# Patient Record
Sex: FEMALE | Race: WHITE | Employment: OTHER | URBAN - METROPOLITAN AREA
[De-identification: names, ages, dates, MRNs, and addresses within clinical notes are randomized per-mention and may not be internally consistent; named-entity substitution may affect disease eponyms.]

---

## 2017-01-09 RX ORDER — LOSARTAN POTASSIUM 50 MG/1
TABLET ORAL
Qty: 30 TABLET | Refills: 11 | Status: SHIPPED | OUTPATIENT
Start: 2017-01-09 | End: 2017-03-21

## 2017-03-20 ENCOUNTER — TELEPHONE (OUTPATIENT)
Dept: INTERNAL MEDICINE CLINIC | Facility: CLINIC | Age: 82
End: 2017-03-20

## 2017-03-20 NOTE — TELEPHONE ENCOUNTER
[place] pharmacist is asking if medication LOSARTAN POTASSIUM 50 MG Oral Tab can be combined to provide as a 90 day supply  States pt will get a better price, with 90 day supply. Please advise.

## 2017-03-21 RX ORDER — LOSARTAN POTASSIUM 50 MG/1
50 TABLET ORAL
Qty: 90 TABLET | Refills: 0 | Status: SHIPPED | OUTPATIENT
Start: 2017-03-21 | End: 2021-01-06

## 2017-03-21 NOTE — TELEPHONE ENCOUNTER
Hypertensive Medications: 90 day supply issued per protocol    Protocol Criteria:  · Appointment scheduled in the past 6 months or in the next 3 months  · BMP or CMP in the past 12 months  · Creatinine result < 2  Recent Visits       Provider Department Pr

## 2017-04-08 RX ORDER — LEVOTHYROXINE SODIUM 0.05 MG/1
TABLET ORAL
Qty: 90 TABLET | Refills: 2 | Status: SHIPPED | OUTPATIENT
Start: 2017-04-08 | End: 2021-01-06

## 2017-07-21 ENCOUNTER — HOSPITAL ENCOUNTER (EMERGENCY)
Facility: HOSPITAL | Age: 82
Discharge: HOME | End: 2017-07-21
Attending: EMERGENCY MEDICINE
Payer: MEDICARE

## 2017-07-21 ENCOUNTER — APPOINTMENT (OUTPATIENT)
Dept: ULTRASOUND IMAGING | Facility: HOSPITAL | Age: 82
End: 2017-07-21
Attending: EMERGENCY MEDICINE
Payer: MEDICARE

## 2017-07-21 VITALS
OXYGEN SATURATION: 94 % | WEIGHT: 180 LBS | HEIGHT: 69 IN | DIASTOLIC BLOOD PRESSURE: 77 MMHG | SYSTOLIC BLOOD PRESSURE: 150 MMHG | HEART RATE: 67 BPM | TEMPERATURE: 97 F | RESPIRATION RATE: 18 BRPM | BODY MASS INDEX: 26.66 KG/M2

## 2017-07-21 DIAGNOSIS — R60.0 BILATERAL LEG EDEMA: Primary | ICD-10-CM

## 2017-07-21 LAB
ANION GAP SERPL CALC-SCNC: 8 MMOL/L (ref 0–18)
BASOPHILS # BLD: 0.1 K/UL (ref 0–0.2)
BASOPHILS NFR BLD: 1 %
BUN SERPL-MCNC: 16 MG/DL (ref 8–20)
BUN/CREAT SERPL: 14.2 (ref 10–20)
CALCIUM SERPL-MCNC: 8.9 MG/DL (ref 8.5–10.5)
CHLORIDE SERPL-SCNC: 104 MMOL/L (ref 95–110)
CO2 SERPL-SCNC: 27 MMOL/L (ref 22–32)
CREAT SERPL-MCNC: 1.13 MG/DL (ref 0.5–1.5)
EOSINOPHIL # BLD: 0.2 K/UL (ref 0–0.7)
EOSINOPHIL NFR BLD: 2 %
ERYTHROCYTE [DISTWIDTH] IN BLOOD BY AUTOMATED COUNT: 13.2 % (ref 11–15)
GLUCOSE SERPL-MCNC: 102 MG/DL (ref 70–99)
HCT VFR BLD AUTO: 40.1 % (ref 35–48)
HGB BLD-MCNC: 13.5 G/DL (ref 12–16)
LYMPHOCYTES # BLD: 1.6 K/UL (ref 1–4)
LYMPHOCYTES NFR BLD: 23 %
MCH RBC QN AUTO: 32.7 PG (ref 27–32)
MCHC RBC AUTO-ENTMCNC: 33.7 G/DL (ref 32–37)
MCV RBC AUTO: 97.1 FL (ref 80–100)
MONOCYTES # BLD: 0.4 K/UL (ref 0–1)
MONOCYTES NFR BLD: 6 %
NEUTROPHILS # BLD AUTO: 4.8 K/UL (ref 1.8–7.7)
NEUTROPHILS NFR BLD: 68 %
OSMOLALITY UR CALC.SUM OF ELEC: 289 MOSM/KG (ref 275–295)
PLATELET # BLD AUTO: 230 K/UL (ref 140–400)
PMV BLD AUTO: 8.8 FL (ref 7.4–10.3)
POTASSIUM SERPL-SCNC: 3.8 MMOL/L (ref 3.3–5.1)
RBC # BLD AUTO: 4.13 M/UL (ref 3.7–5.4)
SODIUM SERPL-SCNC: 139 MMOL/L (ref 136–144)
WBC # BLD AUTO: 7 K/UL (ref 4–11)

## 2017-07-21 PROCEDURE — 93010 ELECTROCARDIOGRAM REPORT: CPT | Performed by: EMERGENCY MEDICINE

## 2017-07-21 PROCEDURE — 36415 COLL VENOUS BLD VENIPUNCTURE: CPT

## 2017-07-21 PROCEDURE — 80048 BASIC METABOLIC PNL TOTAL CA: CPT | Performed by: EMERGENCY MEDICINE

## 2017-07-21 PROCEDURE — 99285 EMERGENCY DEPT VISIT HI MDM: CPT

## 2017-07-21 PROCEDURE — 93970 EXTREMITY STUDY: CPT | Performed by: EMERGENCY MEDICINE

## 2017-07-21 PROCEDURE — 85025 COMPLETE CBC W/AUTO DIFF WBC: CPT | Performed by: EMERGENCY MEDICINE

## 2017-07-21 PROCEDURE — 93005 ELECTROCARDIOGRAM TRACING: CPT

## 2017-07-22 NOTE — ED NOTES
Anay Luque presents to ER c/o bilateral leg swelling x 1 week or so, pain around left ankle otherwise no pain. Pt has no hx of pvd, diabetes or htn and has never had a blood clot.

## 2017-07-22 NOTE — ED PROVIDER NOTES
Patient Seen in: [facility] AND [facility] Emergency Department    History   Patient presents with:  Swelling    Stated Complaint: left leg swelling    HPI    20-year-old female presents for complaint of bilateral lower extremity swelling for the past several da CHOLECYSTECTOMY      Comment: lap olga; laparoscopic lysis of adhesions  11/7/2011, 11/29/2007, 10/18/2007: OTHER SURGICAL HISTORY      Comment: 2011 flexible cystoscopy  9/24/13: XR DEXA BONE DENSITY AXIAL (CPT=77080)    Medications :   LEVOTHYROXINE SOD [07/21/17 1912]  SpO2: 98 % [07/21/17 1912]  O2 Device: None (Room air) [07/21/17 1912]    Current:/77   Pulse 67   Temp (!) 97.3 °F (36.3 °C) (Oral)   Resp 18   Ht 175.3 cm (5' 9\")   Wt 81.6 kg   SpO2 94%   BMI 26.58 kg/m²         Physical Exam   C ------                     CBC W/ DIFFERENTIAL[[id number]]          Abnormal            Final result                 Please view results for these tests on the individual orders. EKG    Rate, intervals and axes as noted on EKG Report.   Rate: 62  Rhythm: Dictated by (CST): Fadi Akbar MD on 7/21/2017 at 20:02     Approved by (CST): Fadi Akbar MD on 7/21/2017 at 20:03          CONCLUSION:  1. No evidence of left and right lower extremity DVT.  2.  Small unruptured Baker's cyst behind the right knee simil

## 2017-10-19 NOTE — TELEPHONE ENCOUNTER
Requesting Sertraline refill    Contacted pt as last OV 9/26/16, pt states she will call back to schedule px with new provider.   Please advise refill    Refill Protocol Appointment Criteria  · Appointment scheduled in the past 6 months or in the next 3 mon

## 2018-02-21 ENCOUNTER — PATIENT OUTREACH (OUTPATIENT)
Dept: CASE MANAGEMENT | Age: 83
End: 2018-02-21

## 2018-02-21 NOTE — PROGRESS NOTES
Outreached to patient in regards to enrollment to Chronic Care Management program. Patient stated that she has not established care with a new PCP since Dr. Jennifer James retired. I informed patient I will call her back in one month and patient agreed.  Thank you

## 2018-04-09 RX ORDER — LEVOTHYROXINE SODIUM 0.05 MG/1
TABLET ORAL
Qty: 90 TABLET | Refills: 1 | OUTPATIENT
Start: 2018-04-09

## 2018-04-11 ENCOUNTER — LAB ENCOUNTER (OUTPATIENT)
Dept: LAB | Facility: HOSPITAL | Age: 83
End: 2018-04-11
Attending: INTERNAL MEDICINE
Payer: MEDICARE

## 2018-04-11 DIAGNOSIS — E78.2 MIXED HYPERLIPIDEMIA: Primary | ICD-10-CM

## 2018-04-11 DIAGNOSIS — I51.9 MYXEDEMA HEART DISEASE: ICD-10-CM

## 2018-04-11 DIAGNOSIS — E03.9 MYXEDEMA HEART DISEASE: ICD-10-CM

## 2018-04-11 PROCEDURE — 81001 URINALYSIS AUTO W/SCOPE: CPT

## 2018-04-11 PROCEDURE — 84443 ASSAY THYROID STIM HORMONE: CPT

## 2018-04-11 PROCEDURE — 84439 ASSAY OF FREE THYROXINE: CPT

## 2018-04-11 PROCEDURE — 80061 LIPID PANEL: CPT

## 2018-04-11 PROCEDURE — 80053 COMPREHEN METABOLIC PANEL: CPT

## 2018-04-11 PROCEDURE — 85025 COMPLETE CBC W/AUTO DIFF WBC: CPT

## 2018-04-11 PROCEDURE — 36415 COLL VENOUS BLD VENIPUNCTURE: CPT

## 2018-04-12 ENCOUNTER — HOSPITAL ENCOUNTER (OUTPATIENT)
Dept: MAMMOGRAPHY | Facility: HOSPITAL | Age: 83
Discharge: HOME | End: 2018-04-12
Attending: INTERNAL MEDICINE
Payer: MEDICARE

## 2018-04-12 DIAGNOSIS — Z12.31 ENCOUNTER FOR SCREENING MAMMOGRAM FOR MALIGNANT NEOPLASM OF BREAST: ICD-10-CM

## 2018-04-12 PROCEDURE — 77067 SCR MAMMO BI INCL CAD: CPT | Performed by: INTERNAL MEDICINE

## 2018-04-14 ENCOUNTER — LAB ENCOUNTER (OUTPATIENT)
Dept: LAB | Facility: HOSPITAL | Age: 83
End: 2018-04-14
Attending: INTERNAL MEDICINE
Payer: MEDICARE

## 2018-04-14 DIAGNOSIS — I51.9 MYXEDEMA HEART DISEASE: ICD-10-CM

## 2018-04-14 DIAGNOSIS — E78.2 MIXED HYPERLIPIDEMIA: Primary | ICD-10-CM

## 2018-04-14 DIAGNOSIS — E03.9 MYXEDEMA HEART DISEASE: ICD-10-CM

## 2018-04-14 PROCEDURE — 81001 URINALYSIS AUTO W/SCOPE: CPT

## 2018-04-14 PROCEDURE — 88108 CYTOPATH CONCENTRATE TECH: CPT

## 2018-04-18 ENCOUNTER — HOSPITAL ENCOUNTER (OUTPATIENT)
Dept: ULTRASOUND IMAGING | Facility: HOSPITAL | Age: 83
Discharge: HOME | End: 2018-04-18
Attending: INTERNAL MEDICINE
Payer: MEDICARE

## 2018-04-18 DIAGNOSIS — R31.21 ASYMPTOMATIC MICROSCOPIC HEMATURIA: ICD-10-CM

## 2018-04-18 PROCEDURE — 76770 US EXAM ABDO BACK WALL COMP: CPT | Performed by: INTERNAL MEDICINE

## 2018-08-14 ENCOUNTER — OFFICE VISIT (OUTPATIENT)
Dept: SURGERY | Facility: CLINIC | Age: 83
End: 2018-08-14
Payer: COMMERCIAL

## 2018-08-14 DIAGNOSIS — R82.89 ABNORMAL URINE CYTOLOGY: ICD-10-CM

## 2018-08-14 DIAGNOSIS — R31.29 MICROSCOPIC HEMATURIA: Primary | ICD-10-CM

## 2018-08-14 LAB
APPEARANCE: CLEAR
BILIRUBIN: NEGATIVE
GLUCOSE (URINE DIPSTICK): NEGATIVE MG/DL
KETONES (URINE DIPSTICK): NEGATIVE MG/DL
LEUKOCYTES: NEGATIVE
MULTISTIX LOT#: NORMAL NUMERIC
NITRITE, URINE: NEGATIVE
PH, URINE: 5.5 (ref 4.5–8)
PROTEIN (URINE DIPSTICK): NEGATIVE MG/DL
SPECIFIC GRAVITY: 1.01 (ref 1–1.03)
URINE-COLOR: YELLOW
UROBILINOGEN,SEMI-QN: 0.2 MG/DL (ref 0–1.9)

## 2018-08-14 PROCEDURE — 99204 OFFICE O/P NEW MOD 45 MIN: CPT | Performed by: UROLOGY

## 2018-08-14 PROCEDURE — 99212 OFFICE O/P EST SF 10 MIN: CPT | Performed by: UROLOGY

## 2018-08-14 PROCEDURE — 81002 URINALYSIS NONAUTO W/O SCOPE: CPT | Performed by: UROLOGY

## 2018-08-14 RX ORDER — LEVOFLOXACIN 500 MG/1
500 TABLET, FILM COATED ORAL DAILY
Qty: 3 TABLET | Refills: 0 | Status: SHIPPED | OUTPATIENT
Start: 2018-08-14 | End: 2018-08-17

## 2018-08-14 NOTE — PROGRESS NOTES
[facility], [facility]  Initial Office Consultation    HPI:   Luis Armando Serna is a 80year old female here today referred by Dr. Sadiq Marcus in consultation for asymptomatic microscopic hematuria and a voided urine cytology revealing atypical cells.     Pe  , 195 Vaginal, 196 Vaginal    • Intestinal adhesions 3/5/2009    lap lysis of adhesions   • Liver cyst 2011    2 probable small cysts or hematomas R lobe of liver - benign   • Onychomycosis    • Other seborrheic keratosis 2014   • Papanicol by mouth daily. Disp: 30 tablet Rfl: 0       Allergies: Patient has no known allergies. REVIEW OF SYSTEMS:  Review of Systems   Constitutional: Negative for appetite change, chills, fatigue, fever and unexpected weight change.    HENT: Negative for heari 04/11/2018       Lab Results  Component Value Date   GLU 90 04/11/2018   BUN 11 04/11/2018   BUNCREA 10.0 04/11/2018   CREATSERUM 1.10 04/11/2018   ANIONGAP 7 04/11/2018   GFRNAA 47 (L) 04/11/2018   GFRAA 54 (L) 04/11/2018   CA 8.8 04/11/2018    04/1 3 day course of Levaquin to be started the day prior to scheduled cystoscopy. The patient was encouraged to reestablish care with a PCP following the detention of her last PCP.     Anais Washburn MD  8/14/2018 11:31 AM

## 2018-08-16 ENCOUNTER — TELEPHONE (OUTPATIENT)
Dept: SURGERY | Facility: CLINIC | Age: 83
End: 2018-08-16

## 2018-08-16 NOTE — TELEPHONE ENCOUNTER
Clayton received a call from Bev Santos in [facility] [address] [place] [address] who called to inform that this pt's CT urogram that is schd for 8/18 at 9:30 am needs PA thru [place] [facility] [place] at [phone].  I called this # and went thru an automated system and was told

## 2018-08-16 NOTE — TELEPHONE ENCOUNTER
Clayton received a call from Tabatha in [address] who called to inform that pt has a CT urogramscan schd for 8/18 at 9:30 am at [facility] and she needs PA. He forwarded this to me since I am assigned to PA's this week.  I called [employer] at [phone] and ton

## 2018-08-18 ENCOUNTER — HOSPITAL ENCOUNTER (OUTPATIENT)
Dept: CT IMAGING | Facility: HOSPITAL | Age: 83
Discharge: HOME | End: 2018-08-18
Attending: UROLOGY
Payer: MEDICARE

## 2018-08-18 DIAGNOSIS — R82.89 ABNORMAL URINE CYTOLOGY: ICD-10-CM

## 2018-08-18 DIAGNOSIS — R31.29 MICROSCOPIC HEMATURIA: ICD-10-CM

## 2018-08-18 LAB — CREAT BLD-MCNC: 1.2 MG/DL (ref 0.5–1.5)

## 2018-08-18 PROCEDURE — 76376 3D RENDER W/INTRP POSTPROCES: CPT | Performed by: UROLOGY

## 2018-08-18 PROCEDURE — 82565 ASSAY OF CREATININE: CPT

## 2018-08-18 PROCEDURE — 74178 CT ABD&PLV WO CNTR FLWD CNTR: CPT | Performed by: UROLOGY

## 2018-08-20 ENCOUNTER — TELEPHONE (OUTPATIENT)
Dept: SURGERY | Facility: CLINIC | Age: 83
End: 2018-08-20

## 2018-08-20 DIAGNOSIS — Z01.818 PRE-OP TESTING: Primary | ICD-10-CM

## 2018-08-20 NOTE — TELEPHONE ENCOUNTER
Dr. Aki Torres came to me about schdg this pt's surgery since Silvia Graves is off today.  I told him that I will make the labs and fill out the schdg form for her but I ma not familiar how she schd's pt's on the computer and I do not see that the schd is templateted to ad

## 2018-08-21 ENCOUNTER — TELEPHONE (OUTPATIENT)
Dept: SURGERY | Facility: CLINIC | Age: 83
End: 2018-08-21

## 2018-08-21 DIAGNOSIS — N28.89 RIGHT RENAL MASS: Primary | ICD-10-CM

## 2018-08-21 NOTE — TELEPHONE ENCOUNTER
Taisha Hernandez states case needs to be resubmitted as retrospective case for more info call [phone].

## 2018-08-23 ENCOUNTER — LAB ENCOUNTER (OUTPATIENT)
Dept: LAB | Facility: HOSPITAL | Age: 83
End: 2018-08-23
Attending: UROLOGY
Payer: MEDICARE

## 2018-08-23 ENCOUNTER — APPOINTMENT (OUTPATIENT)
Dept: LAB | Facility: HOSPITAL | Age: 83
End: 2018-08-23
Attending: UROLOGY
Payer: MEDICARE

## 2018-08-23 ENCOUNTER — HOSPITAL ENCOUNTER (OUTPATIENT)
Dept: GENERAL RADIOLOGY | Facility: HOSPITAL | Age: 83
Discharge: HOME | End: 2018-08-23
Attending: UROLOGY
Payer: MEDICARE

## 2018-08-23 DIAGNOSIS — Z01.818 PRE-OP TESTING: ICD-10-CM

## 2018-08-23 LAB
ANION GAP SERPL CALC-SCNC: 7 MMOL/L (ref 0–18)
APTT PPP: 29.4 SECONDS (ref 23.2–35.3)
BASOPHILS # BLD: 0 K/UL (ref 0–0.2)
BASOPHILS NFR BLD: 1 %
BUN SERPL-MCNC: 12 MG/DL (ref 8–20)
BUN/CREAT SERPL: 11.7 (ref 10–20)
CALCIUM SERPL-MCNC: 8.8 MG/DL (ref 8.5–10.5)
CHLORIDE SERPL-SCNC: 104 MMOL/L (ref 95–110)
CO2 SERPL-SCNC: 26 MMOL/L (ref 22–32)
CREAT SERPL-MCNC: 1.03 MG/DL (ref 0.5–1.5)
EOSINOPHIL # BLD: 0.1 K/UL (ref 0–0.7)
EOSINOPHIL NFR BLD: 2 %
ERYTHROCYTE [DISTWIDTH] IN BLOOD BY AUTOMATED COUNT: 13 % (ref 11–15)
GLUCOSE SERPL-MCNC: 90 MG/DL (ref 70–99)
HCT VFR BLD AUTO: 43.1 % (ref 35–48)
HGB BLD-MCNC: 14.3 G/DL (ref 12–16)
INR BLD: 1 (ref 0.9–1.2)
LYMPHOCYTES # BLD: 1.5 K/UL (ref 1–4)
LYMPHOCYTES NFR BLD: 26 %
MCH RBC QN AUTO: 33.3 PG (ref 27–32)
MCHC RBC AUTO-ENTMCNC: 33.2 G/DL (ref 32–37)
MCV RBC AUTO: 100.4 FL (ref 80–100)
MONOCYTES # BLD: 0.3 K/UL (ref 0–1)
MONOCYTES NFR BLD: 6 %
NEUTROPHILS # BLD AUTO: 3.6 K/UL (ref 1.8–7.7)
NEUTROPHILS NFR BLD: 65 %
OSMOLALITY UR CALC.SUM OF ELEC: 283 MOSM/KG (ref 275–295)
PLATELET # BLD AUTO: 217 K/UL (ref 140–400)
PMV BLD AUTO: 9.4 FL (ref 7.4–10.3)
POTASSIUM SERPL-SCNC: 4.2 MMOL/L (ref 3.3–5.1)
PROTHROMBIN TIME: 12.8 SECONDS (ref 11.8–14.5)
RBC # BLD AUTO: 4.29 M/UL (ref 3.7–5.4)
SODIUM SERPL-SCNC: 137 MMOL/L (ref 136–144)
WBC # BLD AUTO: 5.6 K/UL (ref 4–11)

## 2018-08-23 PROCEDURE — 85025 COMPLETE CBC W/AUTO DIFF WBC: CPT

## 2018-08-23 PROCEDURE — 85730 THROMBOPLASTIN TIME PARTIAL: CPT

## 2018-08-23 PROCEDURE — 93005 ELECTROCARDIOGRAM TRACING: CPT

## 2018-08-23 PROCEDURE — 85610 PROTHROMBIN TIME: CPT

## 2018-08-23 PROCEDURE — 93010 ELECTROCARDIOGRAM REPORT: CPT | Performed by: UROLOGY

## 2018-08-23 PROCEDURE — 36415 COLL VENOUS BLD VENIPUNCTURE: CPT

## 2018-08-23 PROCEDURE — 71045 X-RAY EXAM CHEST 1 VIEW: CPT | Performed by: UROLOGY

## 2018-08-23 PROCEDURE — 80048 BASIC METABOLIC PNL TOTAL CA: CPT

## 2018-08-23 NOTE — TELEPHONE ENCOUNTER
Spoke with patient'  bill verified , scheduled cystoscopy, bilateral retrograde pyelogram, right ureteroscopy with biopsy, possible fulguration,double j stent placement.  18 @ 10:00am, [facility]/outpatient, patient will have l

## 2018-08-23 NOTE — TELEPHONE ENCOUNTER
Phoned clinical review number as outlined below, and spoke with reviewer, Carter JOSÉ. She states case was closed. Asked her why this was done, since nurse, Liz Hoffman submitted request as a \"retro auth\". She confirms there was some confusion on their end.  She is

## 2018-08-27 ENCOUNTER — ANESTHESIA EVENT (OUTPATIENT)
Dept: SURGERY | Facility: HOSPITAL | Age: 83
End: 2018-08-27
Payer: MEDICARE

## 2018-08-27 ENCOUNTER — ANESTHESIA (OUTPATIENT)
Dept: SURGERY | Facility: HOSPITAL | Age: 83
End: 2018-08-27
Payer: MEDICARE

## 2018-08-27 ENCOUNTER — HOSPITAL ENCOUNTER (OUTPATIENT)
Facility: HOSPITAL | Age: 83
Setting detail: HOSPITAL OUTPATIENT SURGERY
Discharge: HOME | End: 2018-08-27
Attending: UROLOGY | Admitting: UROLOGY
Payer: MEDICARE

## 2018-08-27 ENCOUNTER — SURGERY (OUTPATIENT)
Age: 83
End: 2018-08-27

## 2018-08-27 ENCOUNTER — APPOINTMENT (OUTPATIENT)
Dept: GENERAL RADIOLOGY | Facility: HOSPITAL | Age: 83
End: 2018-08-27
Attending: UROLOGY
Payer: MEDICARE

## 2018-08-27 VITALS
TEMPERATURE: 98 F | OXYGEN SATURATION: 99 % | HEART RATE: 52 BPM | WEIGHT: 183 LBS | HEIGHT: 69 IN | SYSTOLIC BLOOD PRESSURE: 136 MMHG | BODY MASS INDEX: 27.11 KG/M2 | DIASTOLIC BLOOD PRESSURE: 81 MMHG | RESPIRATION RATE: 18 BRPM

## 2018-08-27 DIAGNOSIS — N28.89 RIGHT RENAL MASS: ICD-10-CM

## 2018-08-27 PROCEDURE — 0T538ZZ DESTRUCTION OF RIGHT KIDNEY PELVIS, VIA NATURAL OR ARTIFICIAL OPENING ENDOSCOPIC: ICD-10-PCS | Performed by: UROLOGY

## 2018-08-27 PROCEDURE — 0T768DZ DILATION OF RIGHT URETER WITH INTRALUMINAL DEVICE, VIA NATURAL OR ARTIFICIAL OPENING ENDOSCOPIC: ICD-10-PCS | Performed by: UROLOGY

## 2018-08-27 PROCEDURE — 74420 UROGRAPHY RTRGR +-KUB: CPT | Performed by: UROLOGY

## 2018-08-27 PROCEDURE — 52332 CYSTOSCOPY AND TREATMENT: CPT | Performed by: UROLOGY

## 2018-08-27 PROCEDURE — 0TB38ZX EXCISION OF RIGHT KIDNEY PELVIS, VIA NATURAL OR ARTIFICIAL OPENING ENDOSCOPIC, DIAGNOSTIC: ICD-10-PCS | Performed by: UROLOGY

## 2018-08-27 PROCEDURE — BT1D1ZZ FLUOROSCOPY OF RIGHT KIDNEY, URETER AND BLADDER USING LOW OSMOLAR CONTRAST: ICD-10-PCS | Performed by: UROLOGY

## 2018-08-27 PROCEDURE — 52354 CYSTOURETERO W/BIOPSY: CPT | Performed by: UROLOGY

## 2018-08-27 DEVICE — STENT URET 6F 26CM WO GW INL: Type: IMPLANTABLE DEVICE | Site: URETER | Status: FUNCTIONAL

## 2018-08-27 RX ORDER — FAMOTIDINE 20 MG/1
20 TABLET ORAL ONCE
Status: DISCONTINUED | OUTPATIENT
Start: 2018-08-27 | End: 2018-08-27 | Stop reason: HOSPADM

## 2018-08-27 RX ORDER — LIDOCAINE HYDROCHLORIDE 10 MG/ML
INJECTION, SOLUTION EPIDURAL; INFILTRATION; INTRACAUDAL; PERINEURAL AS NEEDED
Status: DISCONTINUED | OUTPATIENT
Start: 2018-08-27 | End: 2018-08-27 | Stop reason: SURG

## 2018-08-27 RX ORDER — NALOXONE HYDROCHLORIDE 0.4 MG/ML
80 INJECTION, SOLUTION INTRAMUSCULAR; INTRAVENOUS; SUBCUTANEOUS AS NEEDED
Status: DISCONTINUED | OUTPATIENT
Start: 2018-08-27 | End: 2018-08-27

## 2018-08-27 RX ORDER — GLYCOPYRROLATE 0.2 MG/ML
INJECTION, SOLUTION INTRAMUSCULAR; INTRAVENOUS AS NEEDED
Status: DISCONTINUED | OUTPATIENT
Start: 2018-08-27 | End: 2018-08-27 | Stop reason: SURG

## 2018-08-27 RX ORDER — HALOPERIDOL 5 MG/ML
0.25 INJECTION INTRAMUSCULAR ONCE AS NEEDED
Status: DISCONTINUED | OUTPATIENT
Start: 2018-08-27 | End: 2018-08-27

## 2018-08-27 RX ORDER — MORPHINE SULFATE 4 MG/ML
4 INJECTION, SOLUTION INTRAMUSCULAR; INTRAVENOUS EVERY 10 MIN PRN
Status: DISCONTINUED | OUTPATIENT
Start: 2018-08-27 | End: 2018-08-27

## 2018-08-27 RX ORDER — ONDANSETRON 2 MG/ML
INJECTION INTRAMUSCULAR; INTRAVENOUS AS NEEDED
Status: DISCONTINUED | OUTPATIENT
Start: 2018-08-27 | End: 2018-08-27 | Stop reason: SURG

## 2018-08-27 RX ORDER — ROCURONIUM BROMIDE 10 MG/ML
INJECTION, SOLUTION INTRAVENOUS AS NEEDED
Status: DISCONTINUED | OUTPATIENT
Start: 2018-08-27 | End: 2018-08-27 | Stop reason: SURG

## 2018-08-27 RX ORDER — HYDROCODONE BITARTRATE AND ACETAMINOPHEN 5; 325 MG/1; MG/1
1 TABLET ORAL AS NEEDED
Status: DISCONTINUED | OUTPATIENT
Start: 2018-08-27 | End: 2018-08-27

## 2018-08-27 RX ORDER — MORPHINE SULFATE 4 MG/ML
2 INJECTION, SOLUTION INTRAMUSCULAR; INTRAVENOUS EVERY 10 MIN PRN
Status: DISCONTINUED | OUTPATIENT
Start: 2018-08-27 | End: 2018-08-27

## 2018-08-27 RX ORDER — ONDANSETRON 2 MG/ML
4 INJECTION INTRAMUSCULAR; INTRAVENOUS ONCE AS NEEDED
Status: COMPLETED | OUTPATIENT
Start: 2018-08-27 | End: 2018-08-27

## 2018-08-27 RX ORDER — SODIUM CHLORIDE, SODIUM LACTATE, POTASSIUM CHLORIDE, CALCIUM CHLORIDE 600; 310; 30; 20 MG/100ML; MG/100ML; MG/100ML; MG/100ML
INJECTION, SOLUTION INTRAVENOUS CONTINUOUS
Status: DISCONTINUED | OUTPATIENT
Start: 2018-08-27 | End: 2018-08-27

## 2018-08-27 RX ORDER — HYDROCODONE BITARTRATE AND ACETAMINOPHEN 5; 325 MG/1; MG/1
2 TABLET ORAL AS NEEDED
Status: DISCONTINUED | OUTPATIENT
Start: 2018-08-27 | End: 2018-08-27

## 2018-08-27 RX ORDER — ACETAMINOPHEN 500 MG
1000 TABLET ORAL ONCE
Status: COMPLETED | OUTPATIENT
Start: 2018-08-27 | End: 2018-08-27

## 2018-08-27 RX ORDER — METOCLOPRAMIDE 10 MG/1
10 TABLET ORAL ONCE
Status: DISCONTINUED | OUTPATIENT
Start: 2018-08-27 | End: 2018-08-27 | Stop reason: HOSPADM

## 2018-08-27 RX ORDER — DEXAMETHASONE SODIUM PHOSPHATE 4 MG/ML
VIAL (ML) INJECTION AS NEEDED
Status: DISCONTINUED | OUTPATIENT
Start: 2018-08-27 | End: 2018-08-27 | Stop reason: SURG

## 2018-08-27 RX ORDER — PHENAZOPYRIDINE HYDROCHLORIDE 100 MG/1
100 TABLET, FILM COATED ORAL 3 TIMES DAILY PRN
Qty: 9 TABLET | Refills: 0 | Status: SHIPPED | OUTPATIENT
Start: 2018-08-27 | End: 2018-08-30

## 2018-08-27 RX ORDER — MORPHINE SULFATE 10 MG/ML
6 INJECTION, SOLUTION INTRAMUSCULAR; INTRAVENOUS EVERY 10 MIN PRN
Status: DISCONTINUED | OUTPATIENT
Start: 2018-08-27 | End: 2018-08-27

## 2018-08-27 RX ORDER — NEOSTIGMINE METHYLSULFATE 0.5 MG/ML
INJECTION INTRAVENOUS AS NEEDED
Status: DISCONTINUED | OUTPATIENT
Start: 2018-08-27 | End: 2018-08-27 | Stop reason: SURG

## 2018-08-27 RX ADMIN — ROCURONIUM BROMIDE 35 MG: 10 INJECTION, SOLUTION INTRAVENOUS at 10:35:00

## 2018-08-27 RX ADMIN — LIDOCAINE HYDROCHLORIDE 40 MG: 10 INJECTION, SOLUTION EPIDURAL; INFILTRATION; INTRACAUDAL; PERINEURAL at 10:35:00

## 2018-08-27 RX ADMIN — SODIUM CHLORIDE, SODIUM LACTATE, POTASSIUM CHLORIDE, CALCIUM CHLORIDE: 600; 310; 30; 20 INJECTION, SOLUTION INTRAVENOUS at 10:29:00

## 2018-08-27 RX ADMIN — SODIUM CHLORIDE, SODIUM LACTATE, POTASSIUM CHLORIDE, CALCIUM CHLORIDE: 600; 310; 30; 20 INJECTION, SOLUTION INTRAVENOUS at 12:05:00

## 2018-08-27 RX ADMIN — GLYCOPYRROLATE 0.4 MG: 0.2 INJECTION, SOLUTION INTRAMUSCULAR; INTRAVENOUS at 11:55:00

## 2018-08-27 RX ADMIN — DEXAMETHASONE SODIUM PHOSPHATE 4 MG: 4 MG/ML VIAL (ML) INJECTION at 10:38:00

## 2018-08-27 RX ADMIN — NEOSTIGMINE METHYLSULFATE 3 MG: 0.5 INJECTION INTRAVENOUS at 11:55:00

## 2018-08-27 RX ADMIN — ONDANSETRON 4 MG: 2 INJECTION INTRAMUSCULAR; INTRAVENOUS at 10:38:00

## 2018-08-27 NOTE — ANESTHESIA POSTPROCEDURE EVALUATION
Patient: Grecia Barrow    Procedure Summary     Date:  08/27/18 Room / Location:  [address] [place] [address] MAIN OR 14 / [address] [place] [address] MAIN OR    Anesthesia Start:  5261 Anesthesia Stop:  1218    Procedure:  CYSTOSCOPY (N/A ) Diagnosis:       Right renal mass      (Right renal mass [S36

## 2018-08-27 NOTE — H&P
History & Physical Examination    Patient Name: Rell Arechiga  MRN: [id number]  CSN: [id number]  YOB: 1935    Diagnosis: Asymptomatic microhematuria and atypical voided cytology.  Right renal pelvis lesion    Present Illness: 80year-old fem of liver - benign   • Onychomycosis    • Osteoporosis    • Other seborrheic keratosis 7/24/2014   • Papanicolaou smear 7/17/2012    negative, atrophic pattern; predominantly parabasal cells   • Perineural cysts 2012    • Thrombophlebitis 2012    surgical

## 2018-08-27 NOTE — ANESTHESIA PROCEDURE NOTES
Airway  Date/Time: 8/27/2018 10:38 AM  Urgency: elective    Airway not difficult    General Information and Staff    Patient location during procedure: OR  Anesthesiologist: Deejay Lugo  Performed: anesthesiologist     Indications and Patient Condi

## 2018-08-27 NOTE — OPERATIVE REPORT
[facility]    Operative Note     Chirag Benitoill Location: OR   [facility] [id number] MRN [id number]   Admission Date 8/27/2018 Operation Date 8/27/2018   Attending Physician Singh Meneses MD Operating Physician MD Jamee Lim Appropriate monitoring devices were connected to the patient. Successful induction of general level endotracheal anesthesia was achieved. IV cefoxitin was administered for perioperative surgical prophylaxis.   She was then positioned in dorsal lithotomy w guidance. This was advanced to the level of the proximal ureter. The inner sheath and Amplatz superstiff wire were removed. A Storz digital flexible ureteroscope was then introduced through the access sheath and up to the level of the renal pelvis.   Fle days.       Yefri Rivas MD  8/27/2018  12:46 PM

## 2018-08-27 NOTE — ANESTHESIA PREPROCEDURE EVALUATION
Anesthesia PreOp Note    HPI:     Mariza Engel is a 80year old female who presents for preoperative consultation requested by: Meme Herron MD    Date of Surgery: 8/27/2018    Procedure(s):  CYSTOSCOPY  CYSTOSCOPY RETROGRADE  CYSTOSCOPY Lamar Santos cyst 2011    2 probable small cysts or hematomas R lobe of liver - benign   • Onychomycosis    • Osteoporosis    • Other seborrheic keratosis 7/24/2014   • Papanicolaou smear 7/17/2012    negative, atrophic pattern; predominantly parabasal cells   • Perine 1038   lactated ringers infusion  Intravenous Continuous Marcelle Okeefe MD    HYDROcodone-acetaminophen [facility]) 5-325 MG per tab 1 tablet 1 tablet Oral PRN Marcelle Okeefe MD    Or        HYDROcodone-acetaminophen (NORCO) 5-325 MG per tab 2 tabl Smoker  1.00 Packs/day  For 10.00 Years     Types: Cigarettes    Quit date: 1/1/1996    Smokeless tobacco: Never Used    Alcohol use Yes  0.0 oz/week     Comment: 1 glass of wine/daily    Drug use: No    Sexual activity: No     Other Topics Concern    Caff hypothyroidism,   Abdominal  - normal exam             Anesthesia Plan:   ASA:  2  Plan:   General  Airway:  ETT  Post-op Pain Management: IV analgesics and Oral pain medication  Informed Consent Plan and Risks Discussed With:  Patient and child/children

## 2018-08-29 ENCOUNTER — TELEPHONE (OUTPATIENT)
Dept: SURGERY | Facility: CLINIC | Age: 83
End: 2018-08-29

## 2018-08-30 ENCOUNTER — TELEPHONE (OUTPATIENT)
Dept: SURGERY | Facility: CLINIC | Age: 83
End: 2018-08-30

## 2018-08-30 NOTE — TELEPHONE ENCOUNTER
Pt's daughter called. Surgery 8-27-18. Received a message to call to schedule appt. 9-4-18. Caller stated pt was told to remove cath. 8-30-18. Pt's boyfriend removed today, pt threw up and is in pain.   Please call pt to advise

## 2018-08-30 NOTE — TELEPHONE ENCOUNTER
Patient's boyfriend removed her JJ stent (as instructed by me). Some nausea and mild vomiting might be expected after stent removal, however usually gets better within a day. She also seems constipated. I recommend hydration with oral fluids as able.  Se

## 2018-08-30 NOTE — TELEPHONE ENCOUNTER
See below. Pt is s/p procedure, 8/27/18 Returned daughter's (listed in [employer]) however male answered, stating his name is Anca Craft and he is pt's boyfriend. He states Carlo Whiteside is not there. Informed Bill he is not listed on [employer], therefor I need to speak with pt.  Spo

## 2018-08-30 NOTE — TELEPHONE ENCOUNTER
Phoned Quiana James back and spoke with him. Read to him 's reply, as outlined below in this encounter, in it's entirety. Jameson verbalized understanding, agrees to plan, and is thankful.

## 2018-08-31 ENCOUNTER — TELEPHONE (OUTPATIENT)
Dept: SURGERY | Facility: CLINIC | Age: 83
End: 2018-08-31

## 2018-08-31 NOTE — TELEPHONE ENCOUNTER
See TE from yesterday. Adam Valenzuela requesting to speak to RN, states he is concerned for pt, states she is still vomiting. Pls call thank you.

## 2018-08-31 NOTE — TELEPHONE ENCOUNTER
Spoke to patient and patient's boyfriend, Geovanna Veramatty.   Bill calling to report that he gave patient a stool softener this morning with her oatmeal, but patient \"threw up the pill\", nonetheless reports that patient did have a bowel movement today; reports 3 \"de

## 2018-09-04 ENCOUNTER — TELEPHONE (OUTPATIENT)
Dept: SURGERY | Facility: CLINIC | Age: 83
End: 2018-09-04

## 2018-09-04 ENCOUNTER — OFFICE VISIT (OUTPATIENT)
Dept: SURGERY | Facility: CLINIC | Age: 83
End: 2018-09-04
Payer: COMMERCIAL

## 2018-09-04 VITALS
BODY MASS INDEX: 27 KG/M2 | SYSTOLIC BLOOD PRESSURE: 133 MMHG | DIASTOLIC BLOOD PRESSURE: 80 MMHG | WEIGHT: 183 LBS | TEMPERATURE: 98 F | HEART RATE: 73 BPM

## 2018-09-04 DIAGNOSIS — C65.1 TRANSITIONAL CELL CARCINOMA OF RENAL PELVIS, RIGHT (HCC): Primary | ICD-10-CM

## 2018-09-04 DIAGNOSIS — C65.1 RENAL PELVIS TRANSITIONAL CELL MALIGNANT NEOPLASM, RIGHT (HCC): Primary | ICD-10-CM

## 2018-09-04 PROCEDURE — 99212 OFFICE O/P EST SF 10 MIN: CPT | Performed by: UROLOGY

## 2018-09-04 NOTE — TELEPHONE ENCOUNTER
Patient seen in office, scheduled for laparoscopic right nephroureterectomy with bladder cuff excision  Tuesday 09/25/18 @10:15, with possible move up to 7:30, went over pre-op with patient and , all questions answered verbalized understanding

## 2018-09-04 NOTE — TELEPHONE ENCOUNTER
Dear Elizabeth Siegel, per Marianna Almaraz' request, this is to inform you that your patient is scheduled for laparoscopic right nephroureterectomy with bladder cuff excision, Tuesday 09/25/18, [facility]/ outpatient., Marianna Almaraz is requesting that esteban

## 2018-09-04 NOTE — PROGRESS NOTES
[facility], [facility]  Follow Up Visit    HPI: Aamir Hoff is a 80year old female presents for a follow up visit for pathology discussion following recent procedure.     Patient initially presented on 8/14/2018 with asymptomatic microscopic hematuria hematuria.        EXAM:  /80 (BP Location: Right arm, Patient Position: Sitting, Cuff Size: adult)   Pulse 73   Temp 98.3 °F (36.8 °C) (Oral)   Wt 183 lb (83 kg)   LMP  (LMP Unknown)   BMI 27.02 kg/m²   Physical Exam    Constitutional: She is oriented urothelial cancer, and retrospective series rather than on quality evidence. .    We also went over the general staging and grading of UTUC as per the AJCC.   I explained to them that high-grade lesions are usually higher stage and that they progress more of prospective studies in this regard and that the Vitor at the data is extrapolated from studies on urothelial bladder cancer. The patient and their family asked appropriate questions all of which were addressed to their satisfaction.   She wishes to proce

## 2018-09-12 ENCOUNTER — MYAURORA ACCOUNT LINK (OUTPATIENT)
Dept: OTHER | Age: 83
End: 2018-09-12

## 2018-09-12 ENCOUNTER — PRIOR ORIGINAL RECORDS (OUTPATIENT)
Dept: OTHER | Age: 83
End: 2018-09-12

## 2018-09-13 ENCOUNTER — TELEPHONE (OUTPATIENT)
Dept: SURGERY | Facility: CLINIC | Age: 83
End: 2018-09-13

## 2018-09-13 NOTE — TELEPHONE ENCOUNTER
Received clearance letter from dr. Josephine Arroyo office, patient all clear to proceed with scheduled. , will place in scan bin.

## 2018-09-15 ENCOUNTER — LAB ENCOUNTER (OUTPATIENT)
Dept: LAB | Facility: HOSPITAL | Age: 83
End: 2018-09-15
Attending: UROLOGY
Payer: MEDICARE

## 2018-09-15 DIAGNOSIS — Z01.818 PRE-OP TESTING: ICD-10-CM

## 2018-09-15 LAB
ANTIBODY SCREEN: NEGATIVE
RH BLOOD TYPE: POSITIVE

## 2018-09-15 PROCEDURE — 36415 COLL VENOUS BLD VENIPUNCTURE: CPT

## 2018-09-15 PROCEDURE — 86900 BLOOD TYPING SEROLOGIC ABO: CPT

## 2018-09-15 PROCEDURE — 86850 RBC ANTIBODY SCREEN: CPT

## 2018-09-15 PROCEDURE — 86901 BLOOD TYPING SEROLOGIC RH(D): CPT

## 2018-09-25 ENCOUNTER — ANESTHESIA EVENT (OUTPATIENT)
Dept: SURGERY | Facility: HOSPITAL | Age: 83
DRG: 655 | End: 2018-09-25
Payer: MEDICARE

## 2018-09-25 ENCOUNTER — ANESTHESIA (OUTPATIENT)
Dept: SURGERY | Facility: HOSPITAL | Age: 83
DRG: 655 | End: 2018-09-25
Payer: MEDICARE

## 2018-09-25 ENCOUNTER — HOSPITAL ENCOUNTER (INPATIENT)
Facility: HOSPITAL | Age: 83
LOS: 2 days | Discharge: HOME | DRG: 655 | End: 2018-09-27
Attending: UROLOGY | Admitting: UROLOGY
Payer: MEDICARE

## 2018-09-25 DIAGNOSIS — Z90.5 HISTORY OF NEPHROURETERECTOMY: ICD-10-CM

## 2018-09-25 DIAGNOSIS — C65.1 TRANSITIONAL CELL CARCINOMA OF RENAL PELVIS, RIGHT (HCC): ICD-10-CM

## 2018-09-25 DIAGNOSIS — Z90.6 HISTORY OF NEPHROURETERECTOMY: ICD-10-CM

## 2018-09-25 DIAGNOSIS — C65.1 RENAL PELVIS TRANSITIONAL CELL MALIGNANT NEOPLASM, RIGHT (HCC): Primary | ICD-10-CM

## 2018-09-25 DIAGNOSIS — Z01.818 PRE-OP TESTING: ICD-10-CM

## 2018-09-25 PROCEDURE — 50548 LAPARO REMOVE W/URETER: CPT | Performed by: UROLOGY

## 2018-09-25 PROCEDURE — 0TBB4ZZ EXCISION OF BLADDER, PERCUTANEOUS ENDOSCOPIC APPROACH: ICD-10-PCS | Performed by: UROLOGY

## 2018-09-25 PROCEDURE — 0TB64ZZ EXCISION OF RIGHT URETER, PERCUTANEOUS ENDOSCOPIC APPROACH: ICD-10-PCS | Performed by: UROLOGY

## 2018-09-25 PROCEDURE — 0TT04ZZ RESECTION OF RIGHT KIDNEY, PERCUTANEOUS ENDOSCOPIC APPROACH: ICD-10-PCS | Performed by: UROLOGY

## 2018-09-25 RX ORDER — MIDAZOLAM HYDROCHLORIDE 1 MG/ML
INJECTION INTRAMUSCULAR; INTRAVENOUS AS NEEDED
Status: DISCONTINUED | OUTPATIENT
Start: 2018-09-25 | End: 2018-09-25 | Stop reason: SURG

## 2018-09-25 RX ORDER — ONDANSETRON 2 MG/ML
INJECTION INTRAMUSCULAR; INTRAVENOUS AS NEEDED
Status: DISCONTINUED | OUTPATIENT
Start: 2018-09-25 | End: 2018-09-25 | Stop reason: SURG

## 2018-09-25 RX ORDER — ONDANSETRON 2 MG/ML
4 INJECTION INTRAMUSCULAR; INTRAVENOUS ONCE AS NEEDED
Status: DISCONTINUED | OUTPATIENT
Start: 2018-09-25 | End: 2018-09-25 | Stop reason: HOSPADM

## 2018-09-25 RX ORDER — MORPHINE SULFATE 10 MG/ML
6 INJECTION, SOLUTION INTRAMUSCULAR; INTRAVENOUS EVERY 10 MIN PRN
Status: DISCONTINUED | OUTPATIENT
Start: 2018-09-25 | End: 2018-09-25 | Stop reason: HOSPADM

## 2018-09-25 RX ORDER — SODIUM CHLORIDE, SODIUM LACTATE, POTASSIUM CHLORIDE, CALCIUM CHLORIDE 600; 310; 30; 20 MG/100ML; MG/100ML; MG/100ML; MG/100ML
INJECTION, SOLUTION INTRAVENOUS CONTINUOUS
Status: DISCONTINUED | OUTPATIENT
Start: 2018-09-25 | End: 2018-09-25

## 2018-09-25 RX ORDER — MORPHINE SULFATE 4 MG/ML
4 INJECTION, SOLUTION INTRAMUSCULAR; INTRAVENOUS EVERY 10 MIN PRN
Status: DISCONTINUED | OUTPATIENT
Start: 2018-09-25 | End: 2018-09-25 | Stop reason: HOSPADM

## 2018-09-25 RX ORDER — GLYCOPYRROLATE 0.2 MG/ML
INJECTION INTRAMUSCULAR; INTRAVENOUS AS NEEDED
Status: DISCONTINUED | OUTPATIENT
Start: 2018-09-25 | End: 2018-09-25 | Stop reason: SURG

## 2018-09-25 RX ORDER — METRONIDAZOLE 500 MG/100ML
500 INJECTION, SOLUTION INTRAVENOUS ONCE
Status: COMPLETED | OUTPATIENT
Start: 2018-09-25 | End: 2018-09-25

## 2018-09-25 RX ORDER — LOSARTAN POTASSIUM 50 MG/1
50 TABLET ORAL
Status: DISCONTINUED | OUTPATIENT
Start: 2018-09-25 | End: 2018-09-27

## 2018-09-25 RX ORDER — SENNOSIDES 8.6 MG
17.2 TABLET ORAL 2 TIMES DAILY
Status: DISCONTINUED | OUTPATIENT
Start: 2018-09-25 | End: 2018-09-27

## 2018-09-25 RX ORDER — NALOXONE HYDROCHLORIDE 0.4 MG/ML
80 INJECTION, SOLUTION INTRAMUSCULAR; INTRAVENOUS; SUBCUTANEOUS AS NEEDED
Status: DISCONTINUED | OUTPATIENT
Start: 2018-09-25 | End: 2018-09-25 | Stop reason: HOSPADM

## 2018-09-25 RX ORDER — LEVOTHYROXINE SODIUM 0.05 MG/1
50 TABLET ORAL
Status: DISCONTINUED | OUTPATIENT
Start: 2018-09-25 | End: 2018-09-27

## 2018-09-25 RX ORDER — HYDROCODONE BITARTRATE AND ACETAMINOPHEN 5; 325 MG/1; MG/1
1 TABLET ORAL AS NEEDED
Status: DISCONTINUED | OUTPATIENT
Start: 2018-09-25 | End: 2018-09-25 | Stop reason: HOSPADM

## 2018-09-25 RX ORDER — MORPHINE SULFATE 4 MG/ML
2 INJECTION, SOLUTION INTRAMUSCULAR; INTRAVENOUS EVERY 10 MIN PRN
Status: DISCONTINUED | OUTPATIENT
Start: 2018-09-25 | End: 2018-09-25 | Stop reason: HOSPADM

## 2018-09-25 RX ORDER — CEFAZOLIN SODIUM/WATER 2 G/20 ML
2 SYRINGE (ML) INTRAVENOUS EVERY 8 HOURS
Status: COMPLETED | OUTPATIENT
Start: 2018-09-25 | End: 2018-09-26

## 2018-09-25 RX ORDER — SODIUM CHLORIDE, SODIUM LACTATE, POTASSIUM CHLORIDE, CALCIUM CHLORIDE 600; 310; 30; 20 MG/100ML; MG/100ML; MG/100ML; MG/100ML
INJECTION, SOLUTION INTRAVENOUS CONTINUOUS
Status: DISCONTINUED | OUTPATIENT
Start: 2018-09-25 | End: 2018-09-26

## 2018-09-25 RX ORDER — METOCLOPRAMIDE 10 MG/1
10 TABLET ORAL ONCE
Status: DISCONTINUED | OUTPATIENT
Start: 2018-09-25 | End: 2018-09-25 | Stop reason: HOSPADM

## 2018-09-25 RX ORDER — SODIUM CHLORIDE, SODIUM LACTATE, POTASSIUM CHLORIDE, CALCIUM CHLORIDE 600; 310; 30; 20 MG/100ML; MG/100ML; MG/100ML; MG/100ML
INJECTION, SOLUTION INTRAVENOUS CONTINUOUS
Status: DISCONTINUED | OUTPATIENT
Start: 2018-09-25 | End: 2018-09-25 | Stop reason: HOSPADM

## 2018-09-25 RX ORDER — ROCURONIUM BROMIDE 10 MG/ML
INJECTION, SOLUTION INTRAVENOUS AS NEEDED
Status: DISCONTINUED | OUTPATIENT
Start: 2018-09-25 | End: 2018-09-25 | Stop reason: SURG

## 2018-09-25 RX ORDER — ENOXAPARIN SODIUM 100 MG/ML
40 INJECTION SUBCUTANEOUS DAILY
Status: DISCONTINUED | OUTPATIENT
Start: 2018-09-25 | End: 2018-09-27

## 2018-09-25 RX ORDER — NEOSTIGMINE METHYLSULFATE 0.5 MG/ML
INJECTION INTRAVENOUS AS NEEDED
Status: DISCONTINUED | OUTPATIENT
Start: 2018-09-25 | End: 2018-09-25 | Stop reason: SURG

## 2018-09-25 RX ORDER — LIDOCAINE HYDROCHLORIDE 10 MG/ML
INJECTION, SOLUTION EPIDURAL; INFILTRATION; INTRACAUDAL; PERINEURAL AS NEEDED
Status: DISCONTINUED | OUTPATIENT
Start: 2018-09-25 | End: 2018-09-25 | Stop reason: SURG

## 2018-09-25 RX ORDER — DEXAMETHASONE SODIUM PHOSPHATE 4 MG/ML
VIAL (ML) INJECTION AS NEEDED
Status: DISCONTINUED | OUTPATIENT
Start: 2018-09-25 | End: 2018-09-25 | Stop reason: SURG

## 2018-09-25 RX ORDER — ONDANSETRON 2 MG/ML
4 INJECTION INTRAMUSCULAR; INTRAVENOUS EVERY 6 HOURS PRN
Status: DISCONTINUED | OUTPATIENT
Start: 2018-09-25 | End: 2018-09-27

## 2018-09-25 RX ORDER — ONDANSETRON 4 MG/1
4 TABLET, FILM COATED ORAL EVERY 6 HOURS PRN
Status: DISCONTINUED | OUTPATIENT
Start: 2018-09-25 | End: 2018-09-27

## 2018-09-25 RX ORDER — SODIUM CHLORIDE 0.9 % (FLUSH) 0.9 %
10 SYRINGE (ML) INJECTION AS NEEDED
Status: DISCONTINUED | OUTPATIENT
Start: 2018-09-25 | End: 2018-09-27

## 2018-09-25 RX ORDER — FAMOTIDINE 20 MG/1
20 TABLET ORAL ONCE
Status: DISCONTINUED | OUTPATIENT
Start: 2018-09-25 | End: 2018-09-25 | Stop reason: HOSPADM

## 2018-09-25 RX ORDER — MORPHINE SULFATE 2 MG/ML
2 INJECTION, SOLUTION INTRAMUSCULAR; INTRAVENOUS EVERY 2 HOUR PRN
Status: DISCONTINUED | OUTPATIENT
Start: 2018-09-25 | End: 2018-09-26

## 2018-09-25 RX ORDER — HALOPERIDOL 5 MG/ML
0.25 INJECTION INTRAMUSCULAR ONCE AS NEEDED
Status: DISCONTINUED | OUTPATIENT
Start: 2018-09-25 | End: 2018-09-25 | Stop reason: HOSPADM

## 2018-09-25 RX ORDER — BUPIVACAINE HYDROCHLORIDE 5 MG/ML
INJECTION, SOLUTION EPIDURAL; INTRACAUDAL AS NEEDED
Status: DISCONTINUED | OUTPATIENT
Start: 2018-09-25 | End: 2018-09-25 | Stop reason: HOSPADM

## 2018-09-25 RX ORDER — DOCUSATE SODIUM 100 MG/1
100 CAPSULE, LIQUID FILLED ORAL 2 TIMES DAILY
Status: DISCONTINUED | OUTPATIENT
Start: 2018-09-25 | End: 2018-09-27

## 2018-09-25 RX ORDER — MORPHINE SULFATE 4 MG/ML
4 INJECTION, SOLUTION INTRAMUSCULAR; INTRAVENOUS EVERY 2 HOUR PRN
Status: DISCONTINUED | OUTPATIENT
Start: 2018-09-25 | End: 2018-09-26

## 2018-09-25 RX ORDER — MORPHINE SULFATE 4 MG/ML
6 INJECTION, SOLUTION INTRAMUSCULAR; INTRAVENOUS EVERY 2 HOUR PRN
Status: DISCONTINUED | OUTPATIENT
Start: 2018-09-25 | End: 2018-09-26

## 2018-09-25 RX ORDER — ACETAMINOPHEN 10 MG/ML
1000 INJECTION, SOLUTION INTRAVENOUS EVERY 8 HOURS
Status: DISCONTINUED | OUTPATIENT
Start: 2018-09-25 | End: 2018-09-26

## 2018-09-25 RX ORDER — HYDROCODONE BITARTRATE AND ACETAMINOPHEN 5; 325 MG/1; MG/1
2 TABLET ORAL AS NEEDED
Status: DISCONTINUED | OUTPATIENT
Start: 2018-09-25 | End: 2018-09-25 | Stop reason: HOSPADM

## 2018-09-25 RX ORDER — SODIUM CHLORIDE 9 MG/ML
INJECTION, SOLUTION INTRAVENOUS CONTINUOUS PRN
Status: DISCONTINUED | OUTPATIENT
Start: 2018-09-25 | End: 2018-09-25 | Stop reason: SURG

## 2018-09-25 RX ORDER — ACETAMINOPHEN 500 MG
1000 TABLET ORAL ONCE
Status: COMPLETED | OUTPATIENT
Start: 2018-09-25 | End: 2018-09-25

## 2018-09-25 RX ADMIN — NEOSTIGMINE METHYLSULFATE 4 MG: 0.5 INJECTION INTRAVENOUS at 12:07:00

## 2018-09-25 RX ADMIN — SODIUM CHLORIDE: 9 INJECTION, SOLUTION INTRAVENOUS at 07:48:00

## 2018-09-25 RX ADMIN — SODIUM CHLORIDE: 9 INJECTION, SOLUTION INTRAVENOUS at 12:23:00

## 2018-09-25 RX ADMIN — ROCURONIUM BROMIDE 20 MG: 10 INJECTION, SOLUTION INTRAVENOUS at 09:30:00

## 2018-09-25 RX ADMIN — ONDANSETRON 4 MG: 2 INJECTION INTRAMUSCULAR; INTRAVENOUS at 12:26:00

## 2018-09-25 RX ADMIN — LIDOCAINE HYDROCHLORIDE 40 MG: 10 INJECTION, SOLUTION EPIDURAL; INFILTRATION; INTRACAUDAL; PERINEURAL at 07:39:00

## 2018-09-25 RX ADMIN — SODIUM CHLORIDE: 9 INJECTION, SOLUTION INTRAVENOUS at 09:14:00

## 2018-09-25 RX ADMIN — MIDAZOLAM HYDROCHLORIDE 2 MG: 1 INJECTION INTRAMUSCULAR; INTRAVENOUS at 07:36:00

## 2018-09-25 RX ADMIN — ROCURONIUM BROMIDE 10 MG: 10 INJECTION, SOLUTION INTRAVENOUS at 08:25:00

## 2018-09-25 RX ADMIN — GLYCOPYRROLATE 0.8 MG: 0.2 INJECTION INTRAMUSCULAR; INTRAVENOUS at 12:07:00

## 2018-09-25 RX ADMIN — SODIUM CHLORIDE, SODIUM LACTATE, POTASSIUM CHLORIDE, CALCIUM CHLORIDE: 600; 310; 30; 20 INJECTION, SOLUTION INTRAVENOUS at 07:35:00

## 2018-09-25 RX ADMIN — ROCURONIUM BROMIDE 10 MG: 10 INJECTION, SOLUTION INTRAVENOUS at 10:20:00

## 2018-09-25 RX ADMIN — ROCURONIUM BROMIDE 10 MG: 10 INJECTION, SOLUTION INTRAVENOUS at 11:12:00

## 2018-09-25 RX ADMIN — ROCURONIUM BROMIDE 20 MG: 10 INJECTION, SOLUTION INTRAVENOUS at 08:11:00

## 2018-09-25 RX ADMIN — ROCURONIUM BROMIDE 50 MG: 10 INJECTION, SOLUTION INTRAVENOUS at 07:40:00

## 2018-09-25 RX ADMIN — DEXAMETHASONE SODIUM PHOSPHATE 4 MG: 4 MG/ML VIAL (ML) INJECTION at 08:24:00

## 2018-09-25 RX ADMIN — METRONIDAZOLE 500 MG: 500 INJECTION, SOLUTION INTRAVENOUS at 08:24:00

## 2018-09-25 RX ADMIN — SODIUM CHLORIDE, SODIUM LACTATE, POTASSIUM CHLORIDE, CALCIUM CHLORIDE: 600; 310; 30; 20 INJECTION, SOLUTION INTRAVENOUS at 12:23:00

## 2018-09-25 NOTE — ANESTHESIA PROCEDURE NOTES
ANESTHESIA INTUBATION  Date/Time: 9/25/2018 8:29 AM  Urgency: elective    Airway not difficult    General Information and Staff    Patient location during procedure: OR  Anesthesiologist: Jamshid Craft MD  Resident/CRNA: MARITZA Carty

## 2018-09-25 NOTE — BRIEF OP NOTE
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

## 2018-09-25 NOTE — OPERATIVE REPORT
[facility]    Operative Note     Fredy Pedro Location: OR   CSN [id number] MRN [id number]   Admission Date 9/25/2018 Operation Date 9/25/2018   Service Urology Surgeon Camilla Tuttle MD      Primary Surgeon: Camilla Tuttle MD      Ass induction and prior to starting the procedure. A 20-Fr 2-way ceballos catheter was placed using aseptic technique. The patient was then placed in the flank position. The kidney bar was raised. The table was extended and all pressure points were well-padded. seeding of tumor during manipulation. RENAL HILAR AND KIDNEY DISSECTION  We then marched alongside the anterior surface of the IVC towards the right renal hilum. The duodenum was kocherized and the right renal vein was circumferentially dissected out.  Cherry White applied. The patient was repositioned supine and extubated uneventfully in the Operating Room. She had stable vital signs throughout the surgery and was transferred to the PACU for routine monitoring.     All lap counts, instrument counts and needle coun

## 2018-09-25 NOTE — ANESTHESIA PREPROCEDURE EVALUATION
Anesthesia PreOp Note    HPI:     Fredy Vasquez is a 80year old female who presents for preoperative consultation requested by: Sharon Zhang MD    Date of Surgery: 9/25/2018    Procedure(s):  LAPAROSCOPIC NEPHROURETERECTOMY  Indication: Transitional pregnancy      Comment:   ,  Vaginal,  Vaginal   3/5/2009: Intestinal adhesions      Comment:  lap lysis of adhesions  2011: Liver cyst      Comment:  2 probable small cysts or hematomas R lobe of liver -                benign  No date:  Fadumo liver   • Cancer Other         Family history of kidney cancer   • Cancer Other         Family history of bladder cancer    • Other (Urolithiasis) Other         Negative family history of Urolithiasis     Social History    Socioeconomic History      Thu 08/23/2018     08/23/2018    MPV 9.4 08/23/2018     Lab Results   Component Value Date     08/23/2018    K 4.2 08/23/2018     08/23/2018    CO2 26 08/23/2018    BUN 12 08/23/2018    CREATSERUM 1.03 08/23/2018    GLU 90 08/23/2018    CA 8

## 2018-09-25 NOTE — H&P
History & Physical Examination    Patient Name: Pipe Delarosa  MRN: [id number]  CSN: [id number]  YOB: 1935    Diagnosis: Right upper tract urothelial cell carcinoma    Present Illness: 80year old female with recently diagnosed right renal COLONOSCOPY  8/27/2018: CYSTOSCOPY; N/A      Comment:  Performed by Meme Brewster MD at [address]  10/18/07, 11/29/07, 1/7/2011: CYSTOSCOPY,INSERT URETERAL STENT  9/24/13: XR DEXA BONE DENSITY AXIAL (CPT=77080)  Family History   Problem Relation Age

## 2018-09-25 NOTE — ANESTHESIA POSTPROCEDURE EVALUATION
Patient: Luc Elias    Procedure Summary     Date:  09/25/18 Room / Location:  [address] [place] [address] MAIN OR 03 / [address] [facility] MAIN OR    Anesthesia Start:  3297 Anesthesia Stop:      Procedure:  LAPAROSCOPIC NEPHROURETERECTOMY (Right ) Diagnosis:       Transitional cell carcino

## 2018-09-25 NOTE — PLAN OF CARE
Problem: Patient/Family Goals  Goal: Patient/Family Long Term Goal  Patient's Long Term Goal: to return home with significant other    Interventions:  - See additional Care Plan goals for specific interventions   Outcome: Progressing    Goal: Patient/Famil

## 2018-09-26 RX ORDER — ONDANSETRON 2 MG/ML
4 INJECTION INTRAMUSCULAR; INTRAVENOUS EVERY 6 HOURS PRN
Status: DISCONTINUED | OUTPATIENT
Start: 2018-09-26 | End: 2018-09-26

## 2018-09-26 RX ORDER — DEXTROSE AND SODIUM CHLORIDE 5; .45 G/100ML; G/100ML
INJECTION, SOLUTION INTRAVENOUS CONTINUOUS
Status: DISCONTINUED | OUTPATIENT
Start: 2018-09-26 | End: 2018-09-27

## 2018-09-26 RX ORDER — MORPHINE SULFATE 2 MG/ML
2 INJECTION, SOLUTION INTRAMUSCULAR; INTRAVENOUS EVERY 2 HOUR PRN
Status: DISCONTINUED | OUTPATIENT
Start: 2018-09-26 | End: 2018-09-27

## 2018-09-26 RX ORDER — OXYCODONE HYDROCHLORIDE AND ACETAMINOPHEN 5; 325 MG/1; MG/1
2 TABLET ORAL EVERY 4 HOURS PRN
Status: DISCONTINUED | OUTPATIENT
Start: 2018-09-26 | End: 2018-09-27

## 2018-09-26 RX ORDER — OXYCODONE HYDROCHLORIDE AND ACETAMINOPHEN 5; 325 MG/1; MG/1
1 TABLET ORAL EVERY 4 HOURS PRN
Status: DISCONTINUED | OUTPATIENT
Start: 2018-09-26 | End: 2018-09-27

## 2018-09-26 NOTE — PROGRESS NOTES
[facility]    Urology Consult Follow-Up Note    Marques Espinal Patient Status:  Inpatient    1935 MRN [id number]   Location [facility] 4W/SW/SE Attending Denny Arriaga, *   Hosp Day # 0 PCP Yane Keenan MD

## 2018-09-26 NOTE — H&P
[facility]    PATIENT'S NAME: Johan Betts   ATTENDING PHYSICIAN: Martin Zamora MD   PATIENT ACCOUNT#:   [de-identified]    LOCATION:  [address] [place] [address] RECORD #:   [id number]       YOB: 1935  ADMISSION DATE:       09 lymphadenopathy or thyromegaly. Carotids with no bruits. LUNGS:  Decreased breath sounds. HEART:  Regular. ABDOMEN:  Tender. Scar on the right side. EXTREMITIES:  No edema. Sensation is normal.  Moves all extremities. No DJD.   Pulses are pre

## 2018-09-26 NOTE — CM/SW NOTE
CTL progression of care note:  During discharge rounds received report that pt is \"wobbly\" when up and weak. Contacted Dr. Leatha Mckoy and informed him of above. Orders for  PT/OT eval received. Pt's RN notified.

## 2018-09-26 NOTE — PROGRESS NOTES
[facility]    Progress Note    [facility] Patient Status:  Inpatient    1935 MRN [id number]   Location [facility] 4W/SW/SE Attending Marcy Francis, [facility] Day # 1 PCP Fernanda Ballard MD        Subject CREATSERUM 1.54 (H) 09/26/2018    BUN 13 09/26/2018     09/26/2018    K 4.7 09/26/2018     09/26/2018    CO2 25 09/26/2018     (H) 09/26/2018    CA 8.1 (L) 09/26/2018    ALB 3.9 04/11/2018    ALKPHO 83 04/11/2018    BILT 0.6 04/11/2018

## 2018-09-26 NOTE — PROGRESS NOTES
[facility]    Urology Consult Follow-Up Note    Edith Javedcharlie Patient Status:  Inpatient    1935 MRN [id number]   Location [facility] 4W/SW/SE Attending Senthil Davis, *   Hosp Day # 1 PCP Maddie Street MD catheter today  8. Labs PRN only if clinical condition changes    Expected D/C home tomorrow.      Onesimo Siddiqi MD  9/26/2018 8:44 AM

## 2018-09-27 VITALS
HEIGHT: 69 IN | TEMPERATURE: 97 F | RESPIRATION RATE: 18 BRPM | WEIGHT: 179.5 LBS | OXYGEN SATURATION: 92 % | SYSTOLIC BLOOD PRESSURE: 143 MMHG | BODY MASS INDEX: 26.59 KG/M2 | DIASTOLIC BLOOD PRESSURE: 73 MMHG | HEART RATE: 92 BPM

## 2018-09-27 PROBLEM — C64.1 RENAL CELL CANCER, RIGHT (HCC): Status: ACTIVE | Noted: 2018-09-27

## 2018-09-27 PROBLEM — Z90.5 STATUS POST NEPHRECTOMY: Status: ACTIVE | Noted: 2018-09-27

## 2018-09-27 RX ORDER — PANTOPRAZOLE SODIUM 40 MG/1
40 TABLET, DELAYED RELEASE ORAL
Status: DISCONTINUED | OUTPATIENT
Start: 2018-09-27 | End: 2018-09-27

## 2018-09-27 RX ORDER — BISACODYL 10 MG
10 SUPPOSITORY, RECTAL RECTAL ONCE
Status: COMPLETED | OUTPATIENT
Start: 2018-09-27 | End: 2018-09-27

## 2018-09-27 RX ORDER — SENNA AND DOCUSATE SODIUM 50; 8.6 MG/1; MG/1
2 TABLET, FILM COATED ORAL
Qty: 20 TABLET | Refills: 0 | Status: SHIPPED | OUTPATIENT
Start: 2018-09-27 | End: 2019-08-08

## 2018-09-27 RX ORDER — ENOXAPARIN SODIUM 100 MG/ML
30 INJECTION SUBCUTANEOUS DAILY
Status: DISCONTINUED | OUTPATIENT
Start: 2018-09-27 | End: 2018-09-27

## 2018-09-27 RX ORDER — FAMOTIDINE 20 MG/1
20 TABLET ORAL 2 TIMES DAILY
Qty: 28 TABLET | Refills: 0 | Status: SHIPPED | OUTPATIENT
Start: 2018-09-27 | End: 2018-10-11

## 2018-09-27 RX ORDER — OXYCODONE HYDROCHLORIDE AND ACETAMINOPHEN 5; 325 MG/1; MG/1
1-2 TABLET ORAL EVERY 6 HOURS PRN
Qty: 30 TABLET | Refills: 0 | Status: SHIPPED | OUTPATIENT
Start: 2018-09-27 | End: 2019-08-08

## 2018-09-27 NOTE — PLAN OF CARE
GASTROINTESTINAL - ADULT    • Minimal or absence of nausea and vomiting Adequate for Discharge        GENITOURINARY - ADULT    • Absence of urinary retention Adequate for Discharge        PAIN - ADULT    • Verbalizes/displays adequate comfort level or esteban

## 2018-09-27 NOTE — PROGRESS NOTES
[facility] Pharmacy Note: Route Optimization for Pantoprazole (PROTONIX)    Patient is currently on Pantoprazole (PROTONIX) 40 mg IV every 24 hours.    The patient meets the criteria to convert to the oral equivalent as established by the IV to Oral conversion pro

## 2018-09-27 NOTE — PROGRESS NOTES
[facility] Pharmacy Note:  Renal Dose Adjustment for Enoxaparin (LOVENOX)    Damien Hawkins has been prescribed Enoxaparin (LOVENOX) 40 mg subcutaneously every 24 hours. Estimated Creatinine Clearance: 29.4 mL/min (A) (based on SCr of 1.54 mg/dL (H)).     Her c

## 2018-09-27 NOTE — PROGRESS NOTES
[facility]  Urology Consult Follow-Up Note    Rell Miss Patient Status:  Inpatient    1935 MRN [id number]   Location [facility] 4W/SW/SE Attending Xi Hwang, [facility] Day # 2 PCP SYLVIA today    Plan to d/c home later today if she continues to do well. Office f/u in 2 weeks for path discussion and wound check, they should call to make appointment.      D/W Dr. Nam Russ MD  9/27/2018 9:43 AM

## 2018-09-27 NOTE — PAYOR COMM NOTE
--------------  ADMISSION REVIEW     [employer] MEDIC[employer] ADV PPO  Subscriber #:  [id number]  Authorization Number: [id number]    Admit date: 9/25/18  Admit time: 0000       Admitting Physician: Csasandra Carvajal MD  Attending Physician:  Melvina Monterroso pregnancy in the distant past, laparoscopic lysis of adhesions, cyst or hematoma in the right lobe of the liver, onychomycosis, osteoporosis, thrombophlebitis, thyroid disease, varicosities, colonoscopy, and cystoscopy.       FAMILY HISTORY:  Father had hea (none) Intravenous Julio C Cherry RN    9/26/2018 1747 New Bag (none) Intravenous Luis Angel Duran RN      docusate sodium (COLACE) cap 100 mg     Date Action Dose Route User    9/27/2018 0830 Given 100 mg Oral Luis Angel Duran RN      Enoxaparin Sodium ( Signs: Blood pressure 120/53, pulse 66, temperature 97 °F (36.1 °C), temperature source Oral, resp. rate 16, height 5' 9\" (1.753 m), weight 179 lb 8 oz (81.4 kg), SpO2 95 %, not currently breastfeeding.      UOP: [place] 400 / 1000 / 500 clear yellow urine Negative. Allergic/Immunologic: Negative. Neurological: Negative. Hematological: Negative. Psychiatric/Behavioral: Negative.               Objective:   Blood pressure 120/53, pulse 66, temperature 97 °F (36.1 °C), temperature source Oral, resp. services that will reasonably be expected to span two midnight's based on the clinical documentation in H+P.    Based on patients current state of illness, I anticipate that, after discharge, patient will require TBD.    9/27 UROLOGY PROGRESS NOTE    Hosp D PT jamar today     Plan to d/c home later today if she continues to do well.  Office f/u in 2 weeks for path discussion and wound check, they should call to make appointment.      D/W Dr. Christopher Padilla

## 2018-09-27 NOTE — OCCUPATIONAL THERAPY NOTE
OCCUPATIONAL THERAPY EVALUATION - INPATIENT     Room Number: 448/448-A  Evaluation Date: 9/27/2018  Type of Evaluation: Initial  Presenting Problem: (R nephrectomy wtih bladder cuff excision )    Physician Order: IP Consult to Occupational Therapy  Reason transitional cell malignant neoplasm, right [facility])      Past Medical History  Past Medical History:   Diagnosis Date   • Abnormal mammogram 2012    R breast   • Ankle sprain    • Benign cyst of left breast 2007    benign cysts left periareolar region   • Be Drives: Yes  Patient Regularly Uses: None    Stairs in Home: 3STE  Use of Assistive Device(s): Has cane     Prior Level of [place]: Pt lives with spouse, reports being independent with ADLs and driving prior.      SUBJECTIVE  Pt noted to be forgetful CJ    FUNCTIONAL TRANSFER ASSESSMENT  Supine to Sit : Not tested(Out of bed at this time )  Sit to Stand: (SBA) w/RW  Toilet Transfer: SBA w/RW  Shower Transfer: n/t   Chair Transfer: SBA w/RW     Bedroom Mobility: SBA w/RW     FUNCTIONAL ADL ASSESSMENT  G

## 2018-09-28 ENCOUNTER — TELEPHONE (OUTPATIENT)
Dept: SURGERY | Facility: CLINIC | Age: 83
End: 2018-09-28

## 2018-09-28 NOTE — PAYOR COMM NOTE
--------------  DISCHARGE REVIEW    Payor: [employer] MEDICARE ADV PPO  Subscriber #:  [id number]  Authorization Number: [id number]    Admit date: 9/25/18  Admit time:  1730  Discharge Date: 9/27/2018  5:33 PM     Admitting Physician: MD Fernanda Dudley decreased. EXTREMITIES:  No edema. DISPOSITION:  Go home. DISCHARGE MEDICATIONS:  See the reconciliation list.    DISCHARGE INSTRUCTIONS:  See me in the office in 1 week.     Dictated By Nisreen Ruiz MD  d: 09/27/2018 16:54:09  t: 09/27/20

## 2018-09-28 NOTE — DISCHARGE SUMMARY
[facility]    PATIENT'S NAME: Mirna Truong   ATTENDING PHYSICIAN: Drea Traylor MD   PATIENT ACCOUNT#:   [de-identified]    LOCATION:  [address] [place] [address] RECORD #:   [id number]       YOB: 1935  ADMISSION DATE:       09

## 2018-10-01 NOTE — TELEPHONE ENCOUNTER
Pt's  called stating pt has not received a call back to set up post op appt, 2 weeks after surgery 9-25-18.   Call

## 2018-10-09 ENCOUNTER — LAB ENCOUNTER (OUTPATIENT)
Dept: LAB | Facility: HOSPITAL | Age: 83
End: 2018-10-09
Attending: UROLOGY
Payer: MEDICARE

## 2018-10-09 ENCOUNTER — OFFICE VISIT (OUTPATIENT)
Dept: SURGERY | Facility: CLINIC | Age: 83
End: 2018-10-09
Payer: COMMERCIAL

## 2018-10-09 VITALS
TEMPERATURE: 98 F | WEIGHT: 183 LBS | DIASTOLIC BLOOD PRESSURE: 70 MMHG | HEIGHT: 69 IN | BODY MASS INDEX: 27.11 KG/M2 | HEART RATE: 69 BPM | SYSTOLIC BLOOD PRESSURE: 130 MMHG

## 2018-10-09 DIAGNOSIS — N18.30 STAGE 3 CHRONIC KIDNEY DISEASE (HCC): Primary | ICD-10-CM

## 2018-10-09 DIAGNOSIS — Z90.5 ACQUIRED SOLITARY KIDNEY: ICD-10-CM

## 2018-10-09 DIAGNOSIS — C65.1 RENAL PELVIS TRANSITIONAL CELL MALIGNANT NEOPLASM, RIGHT (HCC): ICD-10-CM

## 2018-10-09 DIAGNOSIS — Z90.6 HISTORY OF NEPHROURETERECTOMY: ICD-10-CM

## 2018-10-09 DIAGNOSIS — Z90.5 HISTORY OF NEPHROURETERECTOMY: ICD-10-CM

## 2018-10-09 PROCEDURE — 80048 BASIC METABOLIC PNL TOTAL CA: CPT

## 2018-10-09 PROCEDURE — 36415 COLL VENOUS BLD VENIPUNCTURE: CPT

## 2018-10-09 PROCEDURE — 86850 RBC ANTIBODY SCREEN: CPT

## 2018-10-09 PROCEDURE — 86901 BLOOD TYPING SEROLOGIC RH(D): CPT

## 2018-10-09 PROCEDURE — 86900 BLOOD TYPING SEROLOGIC ABO: CPT

## 2018-10-09 NOTE — PROGRESS NOTES
[facility], [facility] Urology  Follow-Up Visit    HPI: Ernesto Dejesus is a 80year old female presents for a follow up visit. 1. 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   She was time.   Skin: Skin is warm and dry. Psychiatric: She has a normal mood and affect.      LABS:    Surgical Pathology                                Case: [id number]                                   Authorizing Provider: Bianca Mari MD            Col 09/25/2018. Final pathology pTa high grade you cc with negative margins (pTa HG Nx Mx). Mrs. Osorio is doing well as far as her postoperative recovery.     I discussed with the patient her risk of developing bladder cancer as well as the risk of developing

## 2019-01-10 ENCOUNTER — OFFICE VISIT (OUTPATIENT)
Dept: SURGERY | Facility: CLINIC | Age: 84
End: 2019-01-10
Payer: COMMERCIAL

## 2019-01-10 VITALS
BODY MASS INDEX: 26.66 KG/M2 | DIASTOLIC BLOOD PRESSURE: 70 MMHG | SYSTOLIC BLOOD PRESSURE: 128 MMHG | TEMPERATURE: 97 F | WEIGHT: 180 LBS | HEIGHT: 69 IN | HEART RATE: 66 BPM

## 2019-01-10 DIAGNOSIS — C65.1 RENAL PELVIS TRANSITIONAL CELL MALIGNANT NEOPLASM, RIGHT (HCC): Primary | ICD-10-CM

## 2019-01-10 PROCEDURE — 52000 CYSTOURETHROSCOPY: CPT | Performed by: UROLOGY

## 2019-01-10 RX ORDER — CIPROFLOXACIN 500 MG/1
500 TABLET, FILM COATED ORAL ONCE
Status: COMPLETED | OUTPATIENT
Start: 2019-01-10 | End: 2019-01-10

## 2019-01-10 RX ADMIN — CIPROFLOXACIN 500 MG: 500 TABLET, FILM COATED ORAL at 14:42:00

## 2019-01-10 NOTE — PROCEDURES
Stasia Spurling  : 1935  Referring Physician: Anna Monahan MD     HPI: H/O pTa right upper tract UCC s/p right radical nephroureterectomy. Here for surveillance cystoscopy. Doing well. Back to coriling.      Procedure: Cystoscopy      CYSTO

## 2019-02-28 VITALS
SYSTOLIC BLOOD PRESSURE: 130 MMHG | HEIGHT: 69 IN | WEIGHT: 180 LBS | DIASTOLIC BLOOD PRESSURE: 76 MMHG | HEART RATE: 73 BPM | RESPIRATION RATE: 18 BRPM | BODY MASS INDEX: 26.66 KG/M2

## 2019-03-04 ENCOUNTER — TELEPHONE (OUTPATIENT)
Dept: SURGERY | Facility: CLINIC | Age: 84
End: 2019-03-04

## 2019-03-04 ENCOUNTER — PATIENT OUTREACH (OUTPATIENT)
Dept: SURGERY | Facility: CLINIC | Age: 84
End: 2019-03-04

## 2019-03-04 NOTE — TELEPHONE ENCOUNTER
After noting that pt has CT scheduled for 4/4/19 [facility] contacted and Expiration date altered to reflect appt scheduled    Approved from 4/4/19-5/4/19 [place] message sent informing to keep scheduled appt, and new expiration date of 4/4/19-5/4/19.

## 2019-03-04 NOTE — TELEPHONE ENCOUNTER
MY chart message sent to pt informing PA for CT urogram approved and to please complete prior to exp. Date.

## 2019-04-04 ENCOUNTER — APPOINTMENT (OUTPATIENT)
Dept: LAB | Facility: HOSPITAL | Age: 84
End: 2019-04-04
Attending: UROLOGY
Payer: MEDICARE

## 2019-04-04 ENCOUNTER — HOSPITAL ENCOUNTER (OUTPATIENT)
Dept: CT IMAGING | Facility: HOSPITAL | Age: 84
Discharge: HOME | End: 2019-04-04
Attending: UROLOGY
Payer: MEDICARE

## 2019-04-04 DIAGNOSIS — C65.1 RENAL PELVIS TRANSITIONAL CELL MALIGNANT NEOPLASM, RIGHT (HCC): ICD-10-CM

## 2019-04-04 PROCEDURE — 74178 CT ABD&PLV WO CNTR FLWD CNTR: CPT | Performed by: UROLOGY

## 2019-04-04 PROCEDURE — 80048 BASIC METABOLIC PNL TOTAL CA: CPT

## 2019-04-04 PROCEDURE — 82565 ASSAY OF CREATININE: CPT

## 2019-04-04 PROCEDURE — 36415 COLL VENOUS BLD VENIPUNCTURE: CPT

## 2019-04-11 ENCOUNTER — OFFICE VISIT (OUTPATIENT)
Dept: SURGERY | Facility: CLINIC | Age: 84
End: 2019-04-11
Payer: COMMERCIAL

## 2019-04-11 VITALS
DIASTOLIC BLOOD PRESSURE: 80 MMHG | HEIGHT: 69 IN | RESPIRATION RATE: 16 BRPM | TEMPERATURE: 97 F | BODY MASS INDEX: 26.66 KG/M2 | SYSTOLIC BLOOD PRESSURE: 130 MMHG | WEIGHT: 180 LBS | HEART RATE: 71 BPM

## 2019-04-11 DIAGNOSIS — C65.1 RENAL PELVIS TRANSITIONAL CELL MALIGNANT NEOPLASM, RIGHT (HCC): Primary | ICD-10-CM

## 2019-04-11 DIAGNOSIS — Z90.5 STATUS POST NEPHRECTOMY: ICD-10-CM

## 2019-04-11 PROCEDURE — 99213 OFFICE O/P EST LOW 20 MIN: CPT | Performed by: UROLOGY

## 2019-04-11 PROCEDURE — 99212 OFFICE O/P EST SF 10 MIN: CPT | Performed by: UROLOGY

## 2019-04-11 PROCEDURE — 52000 CYSTOURETHROSCOPY: CPT | Performed by: UROLOGY

## 2019-04-11 RX ORDER — CIPROFLOXACIN 500 MG/1
500 TABLET, FILM COATED ORAL ONCE
Status: DISCONTINUED | OUTPATIENT
Start: 2019-04-11 | End: 2019-10-10

## 2019-04-11 NOTE — PROGRESS NOTES
[facility], [facility] Urology  Follow-Up Visit    HPI: Damien Hawkins is a 80year old female presents for a follow up visit. Patient was last seen on 1/10/2019. She is accompanied by her boyfriend Tushar Green. INTERVAL HISTORY: Dylan Hunt is doing well.  She denies daly Units 4/4/2019 10/9/2018 9/26/2018 8/23/2018   BUN      7 - 18 mg/dL 22 (H) 16 13 12   CREATININE      0.55 - 1.02 mg/dL 1.47 (H) 1.48 1.54 (H) 1.03     IMAGING:    CT Urogram w+wo IV contrast (4/4/2019) which I personally viewed:    1.  Postoperative sinclair than half the time in face-to-face discussion involving counseling, further management and treatment planning.     Blessing Hussien MD  4/11/2019 2:55 PM

## 2019-04-11 NOTE — PROCEDURES
Fredy Vasquez  : 1935  Referring Physician: Anni Toro MD    Patient presents with:   Other:  H/O pTa right upper tract UCC s/p right radical nephroureterectomy cystoscopy with possible bladder biopsies      CYSTOURETHROSCOPY    Anesth

## 2019-05-20 ENCOUNTER — HOSPITAL ENCOUNTER (OUTPATIENT)
Dept: BONE DENSITY | Facility: HOSPITAL | Age: 84
Discharge: HOME | End: 2019-05-20
Attending: INTERNAL MEDICINE
Payer: MEDICARE

## 2019-05-20 ENCOUNTER — HOSPITAL ENCOUNTER (OUTPATIENT)
Dept: MAMMOGRAPHY | Facility: HOSPITAL | Age: 84
Discharge: HOME | End: 2019-05-20
Attending: INTERNAL MEDICINE
Payer: MEDICARE

## 2019-05-20 DIAGNOSIS — M81.0 OSTEOPOROSIS, UNSPECIFIED OSTEOPOROSIS TYPE, UNSPECIFIED PATHOLOGICAL FRACTURE PRESENCE: ICD-10-CM

## 2019-05-20 DIAGNOSIS — Z12.31 ENCOUNTER FOR SCREENING MAMMOGRAM FOR MALIGNANT NEOPLASM OF BREAST: ICD-10-CM

## 2019-05-20 PROCEDURE — 77080 DXA BONE DENSITY AXIAL: CPT | Performed by: INTERNAL MEDICINE

## 2019-05-20 PROCEDURE — 77067 SCR MAMMO BI INCL CAD: CPT | Performed by: INTERNAL MEDICINE

## 2019-05-20 PROCEDURE — 77063 BREAST TOMOSYNTHESIS BI: CPT | Performed by: INTERNAL MEDICINE

## 2019-06-08 ENCOUNTER — OFFICE VISIT (OUTPATIENT)
Dept: FAMILY MEDICINE CLINIC | Facility: CLINIC | Age: 84
End: 2019-06-08
Payer: MEDICARE

## 2019-06-08 VITALS
DIASTOLIC BLOOD PRESSURE: 64 MMHG | TEMPERATURE: 98 F | WEIGHT: 182 LBS | HEART RATE: 82 BPM | OXYGEN SATURATION: 98 % | BODY MASS INDEX: 26.96 KG/M2 | SYSTOLIC BLOOD PRESSURE: 132 MMHG | HEIGHT: 69 IN

## 2019-06-08 DIAGNOSIS — B02.9 HERPES ZOSTER WITHOUT COMPLICATION: Primary | ICD-10-CM

## 2019-06-08 PROCEDURE — 99202 OFFICE O/P NEW SF 15 MIN: CPT | Performed by: NURSE PRACTITIONER

## 2019-06-08 RX ORDER — VALACYCLOVIR HYDROCHLORIDE 1 G/1
1 TABLET, FILM COATED ORAL
Qty: 21 TABLET | Refills: 0 | Status: SHIPPED | OUTPATIENT
Start: 2019-06-08 | End: 2019-06-15

## 2019-06-08 NOTE — PATIENT INSTRUCTIONS
Shingles (Herpes Zoster)     Talk to your healthcare provider about the shingles vaccine. Shingles is also called herpes zoster. It is a painful skin rash caused by the herpes zoster virus. This is the same virus that causes chickenpox.  After a perso For most people, shingles heals on its own in a few weeks.  But treatment is recommended to help relieve pain, speed healing, and reduce the risk of complications. Antiviral medicines are prescribed within the first 72 hours of the appearance of the rash. T You can only get shingles if you have had chickenpox in the past. Those who have never had chickenpox can get the virus from you. Although instead of developing shingles, the person may get chickenpox.  Until your blisters form scabs, avoid contact with oth

## 2019-06-11 PROBLEM — B02.9 HERPES ZOSTER: Status: ACTIVE | Noted: 2019-06-08

## 2019-06-11 NOTE — PROGRESS NOTES
CHIEF COMPLAINT:   Patient presents with:  Derm Problem: rash on left side, started 2 days ago, painful, pain scale 8 out of 10          HPI:    David Mae is a 80year old female who presents for evaluation of a rash.   Per patient rash started in the History of pregnancy 6532,4883,7011     , 195 Vaginal, 196 Vaginal    • Intestinal adhesions 3/5/2009    lap lysis of adhesions   • Liver cyst     2 probable small cysts or hematomas R lobe of liver - benign   • Onychomycosis    • Osteoporosi No ulcerations. HEENT: Denies rhinorrhea, edema of the lips or swelling of throat. CARDIOVASCULAR: Denies chest pains or palpitations. LUNGS: Denies shortness of breath with exertion or rest. No cough or wheezing.   LYMPH: Denies enlargement of the lymph (three) times daily with meals for 7 days. Risk and benefits of medication discussed. The patient indicates understanding of these issues and agrees to the plan. The patient is asked to return in 3 days if sx's persist or worsen.

## 2019-06-24 ENCOUNTER — HOSPITAL ENCOUNTER (OUTPATIENT)
Dept: MAMMOGRAPHY | Facility: HOSPITAL | Age: 84
Discharge: HOME | End: 2019-06-24
Attending: INTERNAL MEDICINE
Payer: MEDICARE

## 2019-06-24 ENCOUNTER — HOSPITAL ENCOUNTER (OUTPATIENT)
Dept: ULTRASOUND IMAGING | Facility: HOSPITAL | Age: 84
Discharge: HOME | End: 2019-06-24
Attending: INTERNAL MEDICINE
Payer: MEDICARE

## 2019-06-24 DIAGNOSIS — R92.8 ABNORMAL MAMMOGRAM: ICD-10-CM

## 2019-06-24 PROCEDURE — 77062 BREAST TOMOSYNTHESIS BI: CPT | Performed by: INTERNAL MEDICINE

## 2019-06-24 PROCEDURE — 77066 DX MAMMO INCL CAD BI: CPT | Performed by: INTERNAL MEDICINE

## 2019-06-24 PROCEDURE — 76642 ULTRASOUND BREAST LIMITED: CPT | Performed by: INTERNAL MEDICINE

## 2019-08-08 ENCOUNTER — OFFICE VISIT (OUTPATIENT)
Dept: OBGYN CLINIC | Facility: CLINIC | Age: 84
End: 2019-08-08
Payer: COMMERCIAL

## 2019-08-08 VITALS
WEIGHT: 182 LBS | DIASTOLIC BLOOD PRESSURE: 78 MMHG | HEART RATE: 80 BPM | SYSTOLIC BLOOD PRESSURE: 130 MMHG | BODY MASS INDEX: 27 KG/M2

## 2019-08-08 DIAGNOSIS — R92.8 ABNORMAL MAMMOGRAM OF BOTH BREASTS: Primary | ICD-10-CM

## 2019-08-08 PROCEDURE — 99215 OFFICE O/P EST HI 40 MIN: CPT | Performed by: OBSTETRICS & GYNECOLOGY

## 2019-08-08 RX ORDER — DONEPEZIL HYDROCHLORIDE 5 MG/1
TABLET, FILM COATED ORAL
COMMUNITY
Start: 2019-07-25 | End: 2019-08-08

## 2019-08-08 NOTE — PROGRESS NOTES
Irene Ceballos is a 80year old female  No LMP recorded (lmp unknown). Patient is postmenopausal. Patient presents with:  Consult: here for abnormal mammogram. No pain / lumps.   .      OBSTETRICS HISTORY:  OB History     T3    L3    SAB0 NEPHROURETERECTOMY Right 2018    Performed by Marian Rodriguez MD at [address] [place] [address] OR   • LAPAROSCOPICALLY ASSISTED NEPHROURETERECTOMY Right 2018    Dr. Prakash Livers   • XR DEXA BONE DENSITY AXIAL (CPT=77080)  13     OB History     T3    Service: Not Asked        Blood Transfusions: Not Asked        Caffeine Concern: Yes          Daily; one cup, coffee, tea         Occupational Exposure: Not Asked        Hobby Hazards: Not Asked        Sleep Concern: Not Asked        Stress Concer [facility], [facility] PF DIGITAL SCREEN W CAD, 9/04/2013, 8:06. [facility], [facility] PF [facility] CAD, 8/15/2012, 10:27. [facility], [facility]. [facility], [address] [facility] (FEI=[id number]), 6/24/2019, 14:20. mammographic areas of concern. In the right breast at 1 o'clock 1 cm from the nipple, there is a 0.6 x 0.4 x 0.5 cm oval hypoechoic mass with circumscribed margins and no internal vascularity.   This is likely incidental given its location and is probab patient's next mammogram has been entered into a reminder system.        Patient received a discharge summary from the technologist after completion of exam.     Breast marker legend used on images    Triangle = Palpable lump  Dixfield = Skin tag or mole  BB

## 2019-10-10 ENCOUNTER — APPOINTMENT (OUTPATIENT)
Dept: LAB | Facility: HOSPITAL | Age: 84
End: 2019-10-10
Attending: UROLOGY
Payer: MEDICARE

## 2019-10-10 ENCOUNTER — OFFICE VISIT (OUTPATIENT)
Dept: SURGERY | Facility: CLINIC | Age: 84
End: 2019-10-10
Payer: COMMERCIAL

## 2019-10-10 VITALS
BODY MASS INDEX: 25.92 KG/M2 | HEART RATE: 79 BPM | DIASTOLIC BLOOD PRESSURE: 70 MMHG | HEIGHT: 69 IN | WEIGHT: 175 LBS | TEMPERATURE: 98 F | SYSTOLIC BLOOD PRESSURE: 140 MMHG

## 2019-10-10 DIAGNOSIS — Z90.5 STATUS POST NEPHRECTOMY: ICD-10-CM

## 2019-10-10 DIAGNOSIS — C65.1 RENAL PELVIS TRANSITIONAL CELL MALIGNANT NEOPLASM, RIGHT (HCC): ICD-10-CM

## 2019-10-10 DIAGNOSIS — C65.1 RENAL PELVIS TRANSITIONAL CELL MALIGNANT NEOPLASM, RIGHT (HCC): Primary | ICD-10-CM

## 2019-10-10 PROCEDURE — 52000 CYSTOURETHROSCOPY: CPT | Performed by: UROLOGY

## 2019-10-10 PROCEDURE — 80053 COMPREHEN METABOLIC PANEL: CPT

## 2019-10-10 PROCEDURE — 36415 COLL VENOUS BLD VENIPUNCTURE: CPT

## 2019-10-10 PROCEDURE — 99213 OFFICE O/P EST LOW 20 MIN: CPT | Performed by: UROLOGY

## 2019-10-10 RX ORDER — CIPROFLOXACIN 500 MG/1
500 TABLET, FILM COATED ORAL ONCE
Status: COMPLETED | OUTPATIENT
Start: 2019-10-10 | End: 2019-10-10

## 2019-10-10 RX ADMIN — CIPROFLOXACIN 500 MG: 500 TABLET, FILM COATED ORAL at 15:22:00

## 2019-10-10 NOTE — PROCEDURES
Luis Armando Serna  : 1935  Referring Physician: Jewels Musa MD    Patient presents with: History of pTa HG right upper tract UCC S/P right laparoscopic radical nephroureterectomy in 2018. Here for 12-month surveillance cystoscopy.

## 2019-10-10 NOTE — PROGRESS NOTES
[facility], [facility] Urology  Follow-Up Visit    HPI: Fredy Vasquez is a 80year old female presents for a follow up visit. Patient was last seen on 4/11/2019. She is accompanied by her boyfriend Robyn Garcia. INTERVAL HISTORY: Joi Preciado is doing well.  She denies alissa normal mood and affect. PATHOLOGY:    04/2019 Voided cytology: Negative. 01/2019 Voided cytology: Negative  04/2018 Voided cytology (Pre-Op):  Atypical    LABS:    Component      Latest Ref Rng & Units 4/4/2019 10/9/2018 9/26/2018 8/23/2018   BUN     7 cystoscopy, 15 mintues were spent with more than half the time in face-to-face discussion involving counseling, further management and treatment planning.     Sophia Sidhu MD  10/10/19

## 2019-10-14 ENCOUNTER — TELEPHONE (OUTPATIENT)
Dept: SURGERY | Facility: CLINIC | Age: 84
End: 2019-10-14

## 2019-10-14 NOTE — TELEPHONE ENCOUNTER
----- Message from Vernon Larios MD sent at 10/10/2019  5:39 PM CDT -----  Results reviewed. Please inform patient of stable kidney function.

## 2019-10-17 NOTE — TELEPHONE ENCOUNTER
Left message for patient to contact our office regarding test results. please see Dr. Brain Moritz message below.

## 2020-03-25 ENCOUNTER — TELEPHONE (OUTPATIENT)
Dept: SURGERY | Facility: CLINIC | Age: 85
End: 2020-03-25

## 2020-03-25 NOTE — TELEPHONE ENCOUNTER
Rcvd a call from [facility] states they would like to postpone and reschedule the procedure on 4/9 please advise

## 2020-05-27 ENCOUNTER — APPOINTMENT (OUTPATIENT)
Dept: LAB | Facility: HOSPITAL | Age: 85
End: 2020-05-27
Attending: UROLOGY
Payer: MEDICARE

## 2020-05-27 ENCOUNTER — PROCEDURE (OUTPATIENT)
Dept: SURGERY | Facility: CLINIC | Age: 85
End: 2020-05-27
Payer: COMMERCIAL

## 2020-05-27 VITALS — SYSTOLIC BLOOD PRESSURE: 140 MMHG | DIASTOLIC BLOOD PRESSURE: 78 MMHG | RESPIRATION RATE: 18 BRPM | HEART RATE: 80 BPM

## 2020-05-27 DIAGNOSIS — C65.1 RENAL PELVIS TRANSITIONAL CELL MALIGNANT NEOPLASM, RIGHT (HCC): Primary | ICD-10-CM

## 2020-05-27 DIAGNOSIS — C65.1 RENAL PELVIS TRANSITIONAL CELL MALIGNANT NEOPLASM, RIGHT (HCC): ICD-10-CM

## 2020-05-27 PROCEDURE — 52000 CYSTOURETHROSCOPY: CPT | Performed by: UROLOGY

## 2020-05-27 PROCEDURE — 36415 COLL VENOUS BLD VENIPUNCTURE: CPT

## 2020-05-27 PROCEDURE — 80053 COMPREHEN METABOLIC PANEL: CPT

## 2020-05-27 RX ORDER — CIPROFLOXACIN 500 MG/1
500 TABLET, FILM COATED ORAL ONCE
Status: COMPLETED | OUTPATIENT
Start: 2020-05-27 | End: 2020-05-27

## 2020-05-27 RX ADMIN — CIPROFLOXACIN 500 MG: 500 TABLET, FILM COATED ORAL at 13:54:00

## 2020-05-27 NOTE — PROGRESS NOTES
[facility] Urology  Follow-Up Visit    HPI: Irene Ceballos is a 80year old female presents for a follow up visit. Patient was last seen on 10/10/2019. She is accompanied by her boyfriend Long Bailey. INTERVAL HISTORY: Stanford Oro is doing well.  She denies daly Voided cytology: Negative. 04/2019 Voided cytology: Negative. 01/2019 Voided cytology: Negative  04/2018 Voided cytology (Pre-Op):  Atypical      LABS:    Component      Latest Ref Rng & Units 10/10/2019 4/4/2019 10/9/2018 9/26/2018 8/23/2018   BUN     7 answered. PLAN:  1. Send urine for cytology. Patient will be informed of the results. 2. CMP today to monitor renal function. Patient will be notified of the results. 3. Obtain a CT-Urogram for cancer surveillance.      RTC for follow-up in 6 mon

## 2020-06-03 ENCOUNTER — HOSPITAL ENCOUNTER (OUTPATIENT)
Dept: CT IMAGING | Facility: HOSPITAL | Age: 85
Discharge: HOME | End: 2020-06-03
Attending: UROLOGY
Payer: MEDICARE

## 2020-06-03 DIAGNOSIS — C65.1 RENAL PELVIS TRANSITIONAL CELL MALIGNANT NEOPLASM, RIGHT (HCC): ICD-10-CM

## 2020-06-03 PROCEDURE — 82565 ASSAY OF CREATININE: CPT

## 2020-06-03 PROCEDURE — 74176 CT ABD & PELVIS W/O CONTRAST: CPT | Performed by: UROLOGY

## 2020-07-13 ENCOUNTER — TELEPHONE (OUTPATIENT)
Dept: SURGERY | Facility: CLINIC | Age: 85
End: 2020-07-13

## 2020-07-15 NOTE — TELEPHONE ENCOUNTER
Noted. It appears a my chart message was sent, however not viewed by pt. Phoned pt and spoke with her. Read to her 's result nots as outlined below and informed her they went to her my chart.  She states she will have to contact someone who can show he

## 2020-09-26 NOTE — TELEPHONE ENCOUNTER
LM to call back, results also released to my chart. Pt presents to ED with c/o left foot pain. Reports being at work and metal box fell onto left foot. Denies falling or hitting head. No noted deformities or broken skin. Pt states she can't bear weight on left foot. Pt able to move toes and cap refill  <3 sec. NAD noted.

## 2021-01-06 ENCOUNTER — OFFICE VISIT (OUTPATIENT)
Dept: FAMILY MEDICINE CLINIC | Facility: CLINIC | Age: 86
End: 2021-01-06
Payer: COMMERCIAL

## 2021-01-06 VITALS — HEIGHT: 69 IN | BODY MASS INDEX: 28.73 KG/M2 | WEIGHT: 194 LBS

## 2021-01-06 DIAGNOSIS — Z13.6 SCREENING FOR CARDIOVASCULAR CONDITION: ICD-10-CM

## 2021-01-06 DIAGNOSIS — I10 ESSENTIAL HYPERTENSION: ICD-10-CM

## 2021-01-06 DIAGNOSIS — E03.9 ACQUIRED HYPOTHYROIDISM: ICD-10-CM

## 2021-01-06 DIAGNOSIS — Z23 IMMUNIZATION DUE: ICD-10-CM

## 2021-01-06 DIAGNOSIS — C65.1 RENAL PELVIS TRANSITIONAL CELL MALIGNANT NEOPLASM, RIGHT (HCC): ICD-10-CM

## 2021-01-06 DIAGNOSIS — N18.30 STAGE 3 CHRONIC KIDNEY DISEASE, UNSPECIFIED WHETHER STAGE 3A OR 3B CKD (HCC): ICD-10-CM

## 2021-01-06 DIAGNOSIS — Z00.00 ENCOUNTER FOR ANNUAL HEALTH EXAMINATION: Primary | ICD-10-CM

## 2021-01-06 PROBLEM — F03.90 DEMENTIA (HCC): Chronic | Status: ACTIVE | Noted: 2021-01-06

## 2021-01-06 PROBLEM — N18.4 CKD (CHRONIC KIDNEY DISEASE) STAGE 4, GFR 15-29 ML/MIN (HCC): Chronic | Status: ACTIVE | Noted: 2021-01-06

## 2021-01-06 PROBLEM — J41.0 SMOKERS' COUGH (HCC): Chronic | Status: ACTIVE | Noted: 2021-01-06

## 2021-01-06 PROCEDURE — 3008F BODY MASS INDEX DOCD: CPT | Performed by: STUDENT IN AN ORGANIZED HEALTH CARE EDUCATION/TRAINING PROGRAM

## 2021-01-06 PROCEDURE — G0439 PPPS, SUBSEQ VISIT: HCPCS | Performed by: STUDENT IN AN ORGANIZED HEALTH CARE EDUCATION/TRAINING PROGRAM

## 2021-01-06 PROCEDURE — 99497 ADVNCD CARE PLAN 30 MIN: CPT | Performed by: STUDENT IN AN ORGANIZED HEALTH CARE EDUCATION/TRAINING PROGRAM

## 2021-01-06 RX ORDER — ZOSTER VACCINE RECOMBINANT, ADJUVANTED 50 MCG/0.5
1 KIT INTRAMUSCULAR ONCE
Qty: 1 EACH | Refills: 1 | Status: SHIPPED | OUTPATIENT
Start: 2021-01-06 | End: 2021-01-06

## 2021-01-06 RX ORDER — LEVOTHYROXINE SODIUM 0.05 MG/1
50 TABLET ORAL DAILY
Qty: 90 TABLET | Refills: 3 | Status: SHIPPED | OUTPATIENT
Start: 2021-01-06 | End: 2021-04-12

## 2021-01-06 RX ORDER — A/SINGAPORE/GP1908/2015 IVR-180 (AN A/MICHIGAN/45/2015 (H1N1)PDM09-LIKE VIRUS, A/HONG KONG/4801/2014, NYMC X-263B (H3N2) (AN A/HONG KONG/4801/2014-LIKE VIRUS), AND B/BRISBANE/60/2008, WILD TYPE (A B/BRISBANE/60/2008-LIKE VIRUS) 15; 15; 15 UG/.5ML; UG/.5ML; UG/.5ML
INJECTION, SUSPENSION INTRAMUSCULAR
COMMUNITY
Start: 2020-09-23 | End: 2021-06-11

## 2021-01-06 RX ORDER — LOSARTAN POTASSIUM 50 MG/1
50 TABLET ORAL
Qty: 90 TABLET | Refills: 0 | Status: SHIPPED | OUTPATIENT
Start: 2021-01-06 | End: 2021-04-12

## 2021-01-06 NOTE — PATIENT INSTRUCTIONS
Norah Osorio's SCREENING SCHEDULE   Tests on this list are recommended by your physician but may not be covered, or covered at this frequency, by your insurer. Please check with your insurance carrier before scheduling to verify coverage.    PREVENTATIV 75     Colonoscopy Screen   Covered every 10 years- more often if abnormal There are no preventive care reminders to display for this patient.  Update Health Maintenance if applicable    Flex Sigmoidoscopy Screen  Covered every 5 years No results found for orders found for this or any previous visit. Please get once after your 65th birthday    Pneumococcal 23 (Pneumovax)  Covered Once after 65 No orders found for this or any previous visit.  Please get once after your 65th birthday    Hepatitis B for Moderate

## 2021-01-06 NOTE — PROGRESS NOTES
HPI:   Stasia Spurling is a 80year old female who presents for a Medicare Subsequent Annual Wellness visit (Pt already had Initial Annual Wellness). She presents with her partner, who has concerns about her memory.  Pt feels she has had age-related memor Epic.   Advance care planning including the explanation and discussion of advance directives standard forms performed Face to Face with patient and Family/surrogate (if present), and forms available to patient in AVS       She smoked tobacco in the past bu 12.4 2018     2018        ALLERGIES:   She has No Known Allergies. CURRENT MEDICATIONS:     •  [] Zoster Vac Recomb Adjuvanted (SHINGRIX) 50 MCG/0.5ML Intramuscular Recon Susp, Inject 1 Dose into the muscle one time for 1 dose. Her smoking use included cigarettes. She has a 10.00 pack-year smoking history. She has never used smokeless tobacco. She reports current alcohol use. She reports that she does not use drugs. REVIEW OF SYSTEMS:   Pertinent items are noted in HPI.   Cons Acuity: Corrected Both Eyes Chart Acuity: 20/50   Able To Tolerate Visual Acuity: Yes      General Appearance:  Alert, cooperative, no distress, appears stated age   Head:  Normocephalic, without obvious abnormality, atraumatic   Eyes:  PERRL, conjunctiva Future  -     ADVANCE CARE PLANNING FIRST 30 MINS    Essential hypertension  Med refilled  -     losartan Potassium 50 MG Oral Tab; Take 1 tablet (50 mg total) by mouth once daily.     Acquired hypothyroidism  - will check labs  Med refilled  -     TSH W RE necessary Electrocardiogram date08/23/2018       Colorectal Cancer Screening      Colonoscopy Screen every 10 years There are no preventive care reminders to display for this patient.  Update Health Maintenance if applicable    Flex Sigmoidoscopy Screen mj metal- TD and TDaP Not covered by Medicare Part B 06/18/2003 This may be covered with your prescription benefits, but Medicare does not cover unless Medically needed    Zoster  Not covered by Medicare Part B No vaccine history found This may be covered wit

## 2021-01-19 ENCOUNTER — LAB ENCOUNTER (OUTPATIENT)
Dept: LAB | Age: 86
End: 2021-01-19
Attending: Other
Payer: MEDICARE

## 2021-01-19 DIAGNOSIS — E03.9 ACQUIRED HYPOTHYROIDISM: ICD-10-CM

## 2021-01-19 DIAGNOSIS — Z13.6 SCREENING FOR CARDIOVASCULAR CONDITION: ICD-10-CM

## 2021-01-19 DIAGNOSIS — Z00.00 ENCOUNTER FOR ANNUAL HEALTH EXAMINATION: ICD-10-CM

## 2021-01-19 LAB
ALBUMIN SERPL-MCNC: 3.6 G/DL (ref 3.4–5)
ALBUMIN/GLOB SERPL: 0.9 {RATIO} (ref 1–2)
ALP LIVER SERPL-CCNC: 103 U/L
ALT SERPL-CCNC: 28 U/L
ANION GAP SERPL CALC-SCNC: 5 MMOL/L (ref 0–18)
AST SERPL-CCNC: 19 U/L (ref 15–37)
BILIRUB SERPL-MCNC: 0.7 MG/DL (ref 0.1–2)
BUN BLD-MCNC: 16 MG/DL (ref 7–18)
BUN/CREAT SERPL: 9.5 (ref 10–20)
CALCIUM BLD-MCNC: 9 MG/DL (ref 8.5–10.1)
CHLORIDE SERPL-SCNC: 111 MMOL/L (ref 98–112)
CHOLEST SMN-MCNC: 171 MG/DL (ref ?–200)
CO2 SERPL-SCNC: 26 MMOL/L (ref 21–32)
CREAT BLD-MCNC: 1.68 MG/DL
GLOBULIN PLAS-MCNC: 4 G/DL (ref 2.8–4.4)
GLUCOSE BLD-MCNC: 93 MG/DL (ref 70–99)
HDLC SERPL-MCNC: 51 MG/DL (ref 40–59)
LDLC SERPL CALC-MCNC: 86 MG/DL (ref ?–100)
M PROTEIN MFR SERPL ELPH: 7.6 G/DL (ref 6.4–8.2)
NONHDLC SERPL-MCNC: 120 MG/DL (ref ?–130)
OSMOLALITY SERPL CALC.SUM OF ELEC: 295 MOSM/KG (ref 275–295)
PATIENT FASTING Y/N/NP: YES
PATIENT FASTING Y/N/NP: YES
POTASSIUM SERPL-SCNC: 4.2 MMOL/L (ref 3.5–5.1)
SODIUM SERPL-SCNC: 142 MMOL/L (ref 136–145)
T4 FREE SERPL-MCNC: 1 NG/DL (ref 0.8–1.7)
TRIGL SERPL-MCNC: 172 MG/DL (ref 30–149)
TSI SER-ACNC: 5.89 MIU/ML (ref 0.36–3.74)
VLDLC SERPL CALC-MCNC: 34 MG/DL (ref 0–30)

## 2021-01-19 PROCEDURE — 80053 COMPREHEN METABOLIC PANEL: CPT

## 2021-01-19 PROCEDURE — 36415 COLL VENOUS BLD VENIPUNCTURE: CPT

## 2021-01-19 PROCEDURE — 84443 ASSAY THYROID STIM HORMONE: CPT

## 2021-01-19 PROCEDURE — 84439 ASSAY OF FREE THYROXINE: CPT

## 2021-01-19 PROCEDURE — 80061 LIPID PANEL: CPT

## 2021-01-29 DIAGNOSIS — Z23 NEED FOR VACCINATION: ICD-10-CM

## 2021-02-10 ENCOUNTER — IMMUNIZATION (OUTPATIENT)
Dept: LAB | Facility: HOSPITAL | Age: 86
End: 2021-02-10
Attending: HOSPITALIST
Payer: MEDICARE

## 2021-02-10 DIAGNOSIS — Z23 NEED FOR VACCINATION: Primary | ICD-10-CM

## 2021-02-10 PROCEDURE — 0011A SARSCOV2 VAC 100MCG/0.5ML IM: CPT

## 2021-03-11 ENCOUNTER — IMMUNIZATION (OUTPATIENT)
Dept: LAB | Facility: HOSPITAL | Age: 86
End: 2021-03-11
Attending: EMERGENCY MEDICINE
Payer: MEDICARE

## 2021-03-11 DIAGNOSIS — Z23 NEED FOR VACCINATION: Primary | ICD-10-CM

## 2021-03-11 PROCEDURE — 0012A SARSCOV2 VAC 100MCG/0.5ML IM: CPT

## 2021-04-12 DIAGNOSIS — E03.9 ACQUIRED HYPOTHYROIDISM: ICD-10-CM

## 2021-04-12 DIAGNOSIS — I10 ESSENTIAL HYPERTENSION: ICD-10-CM

## 2021-04-12 RX ORDER — LEVOTHYROXINE SODIUM 0.05 MG/1
50 TABLET ORAL DAILY
Qty: 90 TABLET | Refills: 3 | Status: SHIPPED | OUTPATIENT
Start: 2021-04-12

## 2021-04-12 RX ORDER — LOSARTAN POTASSIUM 50 MG/1
50 TABLET ORAL
Qty: 90 TABLET | Refills: 0 | Status: SHIPPED | OUTPATIENT
Start: 2021-04-12 | End: 2021-07-09

## 2021-04-12 RX ORDER — LOSARTAN POTASSIUM 50 MG/1
TABLET ORAL
Qty: 90 TABLET | Refills: 0 | OUTPATIENT
Start: 2021-04-12

## 2021-06-10 ENCOUNTER — VIRTUAL PHONE E/M (OUTPATIENT)
Dept: FAMILY MEDICINE CLINIC | Facility: CLINIC | Age: 86
End: 2021-06-10
Payer: COMMERCIAL

## 2021-06-10 ENCOUNTER — TELEPHONE (OUTPATIENT)
Dept: FAMILY MEDICINE CLINIC | Facility: CLINIC | Age: 86
End: 2021-06-10

## 2021-06-10 DIAGNOSIS — R53.83 FATIGUE, UNSPECIFIED TYPE: Primary | ICD-10-CM

## 2021-06-10 NOTE — TELEPHONE ENCOUNTER
Patient's daughter Krista Adamson would like a call back from you as she needs to discuss further problems that her mom did not address at this morning's visit.

## 2021-06-10 NOTE — PROGRESS NOTES
Virtual Telephone Check-In    Luis Armando Serna verbally consents to a Virtual/Telephone Check-In visit on 06/10/21. Patient has been referred to the [facility] website at [email or web address] to review the yearly Consent to Treat document.     Patient Cinda the plan of care above. Coding/billing information is submitted for this visit based on complexity of care and/or time spent for the visit. HPI:    Patient ID: Fredy Vasquez is a 80year old female. HPI  Pt presenting via phone call.  She reports de Imaging & Referrals:  None         #[id number]

## 2021-06-10 NOTE — TELEPHONE ENCOUNTER
pts daughter olamide who is on hippa would like to talk to [address] regarding patient. Pt did have appt today 6/10. Daughter would like to discuss patients health.  Please advise

## 2021-06-10 NOTE — TELEPHONE ENCOUNTER
Spoke with pt's daughter Michael Peña. She wanted to share information re: patient. Pt has h/o dementia, continues to have sick symptoms but unable to report reliably due to dementia. Pt's boyfriend [place] Jessie lives in the home but is afraid of her, unwilling to report symptoms due to fear of her yelling at him. She forgets, has missed multiple events. H/o renal CA s/p cystectomy. She does not drink enough water. Not incontinence. No recent MD visit.

## 2021-06-11 ENCOUNTER — OFFICE VISIT (OUTPATIENT)
Dept: FAMILY MEDICINE CLINIC | Facility: CLINIC | Age: 86
End: 2021-06-11
Payer: COMMERCIAL

## 2021-06-11 ENCOUNTER — LAB ENCOUNTER (OUTPATIENT)
Dept: LAB | Age: 86
End: 2021-06-11
Attending: STUDENT IN AN ORGANIZED HEALTH CARE EDUCATION/TRAINING PROGRAM
Payer: MEDICARE

## 2021-06-11 ENCOUNTER — TELEPHONE (OUTPATIENT)
Dept: FAMILY MEDICINE CLINIC | Facility: CLINIC | Age: 86
End: 2021-06-11

## 2021-06-11 VITALS
TEMPERATURE: 98 F | WEIGHT: 187 LBS | SYSTOLIC BLOOD PRESSURE: 148 MMHG | BODY MASS INDEX: 27.7 KG/M2 | HEIGHT: 69 IN | HEART RATE: 87 BPM | DIASTOLIC BLOOD PRESSURE: 79 MMHG

## 2021-06-11 DIAGNOSIS — R53.83 FATIGUE, UNSPECIFIED TYPE: Primary | ICD-10-CM

## 2021-06-11 DIAGNOSIS — R11.0 NAUSEA: ICD-10-CM

## 2021-06-11 DIAGNOSIS — I10 ESSENTIAL HYPERTENSION: ICD-10-CM

## 2021-06-11 DIAGNOSIS — N39.0 URINARY TRACT INFECTION WITHOUT HEMATURIA, SITE UNSPECIFIED: ICD-10-CM

## 2021-06-11 DIAGNOSIS — N18.4 CKD (CHRONIC KIDNEY DISEASE) STAGE 4, GFR 15-29 ML/MIN (HCC): ICD-10-CM

## 2021-06-11 DIAGNOSIS — R53.83 FATIGUE, UNSPECIFIED TYPE: ICD-10-CM

## 2021-06-11 DIAGNOSIS — F03.90 DEMENTIA WITHOUT BEHAVIORAL DISTURBANCE, UNSPECIFIED DEMENTIA TYPE (HCC): ICD-10-CM

## 2021-06-11 PROCEDURE — 3077F SYST BP >= 140 MM HG: CPT | Performed by: STUDENT IN AN ORGANIZED HEALTH CARE EDUCATION/TRAINING PROGRAM

## 2021-06-11 PROCEDURE — 99215 OFFICE O/P EST HI 40 MIN: CPT | Performed by: STUDENT IN AN ORGANIZED HEALTH CARE EDUCATION/TRAINING PROGRAM

## 2021-06-11 PROCEDURE — 84439 ASSAY OF FREE THYROXINE: CPT

## 2021-06-11 PROCEDURE — 36415 COLL VENOUS BLD VENIPUNCTURE: CPT

## 2021-06-11 PROCEDURE — 80061 LIPID PANEL: CPT

## 2021-06-11 PROCEDURE — 81002 URINALYSIS NONAUTO W/O SCOPE: CPT | Performed by: STUDENT IN AN ORGANIZED HEALTH CARE EDUCATION/TRAINING PROGRAM

## 2021-06-11 PROCEDURE — 83036 HEMOGLOBIN GLYCOSYLATED A1C: CPT

## 2021-06-11 PROCEDURE — 3008F BODY MASS INDEX DOCD: CPT | Performed by: STUDENT IN AN ORGANIZED HEALTH CARE EDUCATION/TRAINING PROGRAM

## 2021-06-11 PROCEDURE — 85027 COMPLETE CBC AUTOMATED: CPT

## 2021-06-11 PROCEDURE — 84443 ASSAY THYROID STIM HORMONE: CPT

## 2021-06-11 PROCEDURE — 80053 COMPREHEN METABOLIC PANEL: CPT

## 2021-06-11 PROCEDURE — 3078F DIAST BP <80 MM HG: CPT | Performed by: STUDENT IN AN ORGANIZED HEALTH CARE EDUCATION/TRAINING PROGRAM

## 2021-06-11 RX ORDER — CEPHALEXIN 500 MG/1
500 CAPSULE ORAL 2 TIMES DAILY
Qty: 14 CAPSULE | Refills: 0 | Status: SHIPPED | OUTPATIENT
Start: 2021-06-11 | End: 2021-12-07 | Stop reason: ALTCHOICE

## 2021-06-11 RX ORDER — ONDANSETRON 4 MG/1
4 TABLET, FILM COATED ORAL EVERY 8 HOURS PRN
Qty: 30 TABLET | Refills: 0 | Status: SHIPPED | OUTPATIENT
Start: 2021-06-11 | End: 2021-12-07

## 2021-06-11 NOTE — TELEPHONE ENCOUNTER
Received incoming page from pharmacy to verify order for antibiotics. Per pharmacy, patient's daughter said that she thought there was a change that needed to be made to her antibiotics after urine culture and sensitivity report.   Informed that culture wa

## 2021-06-11 NOTE — PROGRESS NOTES
HPI:    Patient ID: La Chappell is a 80year old female. HPI  Pt presenting with fatigue. Daughter Carlo Whiteside is present for visit. Pt reports decreased energy level associated with mild nausea for the last few weeks.  She denies any abd pain or vomiting, and Rhythm: Normal rate and regular rhythm. Pulses: Normal pulses. Heart sounds: Normal heart sounds, S1 normal and S2 normal. No murmur heard. Pulmonary:      Effort: Pulmonary effort is normal. No respiratory distress.       Breath sounds: N intake. - will review labs above    4.  Essential hypertension  In-office BP stable, pt otherwise asymptomatic  - discussed benefits of DASH diet and daily activity  - continue BP monitoring  - continue daily medication  - advised to call if BP is persist

## 2021-07-09 DIAGNOSIS — I10 ESSENTIAL HYPERTENSION: ICD-10-CM

## 2021-07-09 RX ORDER — LOSARTAN POTASSIUM 50 MG/1
TABLET ORAL
Qty: 90 TABLET | Refills: 0 | Status: SHIPPED | OUTPATIENT
Start: 2021-07-09 | End: 2021-09-14

## 2021-07-14 NOTE — TELEPHONE ENCOUNTER
Pt daughter called requesting if  can call her back. She would like to discuss with her about pts dementia and would like advisement on next steps. Please advise.

## 2021-07-15 NOTE — TELEPHONE ENCOUNTER
Spoke with daughter Debbie Waggonerdarinel re: pt's behavioral changes, with violent outbursts and increased aggression. Pt does not recall episodes. Daughter is concerned for her own safety, worries that pt will continue to decline.  Discussed NeuroPsych testing as option,

## 2021-09-14 DIAGNOSIS — I10 ESSENTIAL HYPERTENSION: ICD-10-CM

## 2021-09-14 RX ORDER — LOSARTAN POTASSIUM 50 MG/1
TABLET ORAL
Qty: 90 TABLET | Refills: 0 | Status: SHIPPED | OUTPATIENT
Start: 2021-09-14

## 2021-11-29 ENCOUNTER — MED REC SCAN ONLY (OUTPATIENT)
Dept: FAMILY MEDICINE CLINIC | Facility: CLINIC | Age: 86
End: 2021-11-29

## 2021-11-29 NOTE — TELEPHONE ENCOUNTER
Behavioral assessment was reviewed. Pt to follow-up with Neurology for further evaluation and recommendation. Referral in chart.

## 2021-11-29 NOTE — TELEPHONE ENCOUNTER
Patient daughter is requesting a call back, wants to know what the next step is for the patient. She received test results and is at the beginning of alzheimer. Please call daughter back if she needs to bring her in for appointment.

## 2021-12-07 ENCOUNTER — TELEPHONE (OUTPATIENT)
Dept: NEUROLOGY | Facility: CLINIC | Age: 86
End: 2021-12-07

## 2021-12-07 ENCOUNTER — LAB ENCOUNTER (OUTPATIENT)
Dept: LAB | Facility: HOSPITAL | Age: 86
End: 2021-12-07
Attending: Other
Payer: MEDICARE

## 2021-12-07 DIAGNOSIS — G30.9 ALZHEIMER DISEASE (HCC): ICD-10-CM

## 2021-12-07 DIAGNOSIS — F02.80 ALZHEIMER DISEASE (HCC): ICD-10-CM

## 2021-12-07 PROCEDURE — 36415 COLL VENOUS BLD VENIPUNCTURE: CPT

## 2021-12-07 PROCEDURE — 82746 ASSAY OF FOLIC ACID SERUM: CPT

## 2021-12-07 PROCEDURE — 82607 VITAMIN B-12: CPT

## 2021-12-07 PROCEDURE — 84443 ASSAY THYROID STIM HORMONE: CPT

## 2021-12-07 PROCEDURE — 84439 ASSAY OF FREE THYROXINE: CPT

## 2021-12-07 PROCEDURE — 86780 TREPONEMA PALLIDUM: CPT

## 2021-12-07 NOTE — TELEPHONE ENCOUNTER
[employer] for authorization of approval for CT BRAIN OR HEAD (52241). Authorization Number [id number] effective Dec 7 2021 - Jan 6 2022. Gini ring. Transferred call to scheduling for appt.

## 2021-12-07 NOTE — PROGRESS NOTES
Neurology Initial Visit     Referred By: Dr. Ruiz ref. provider found    Chief Complaint: Patient presents with:  Memory Loss: Pt presents today with daughter for memory problems.  Pt had neuropsycological testing done on 9/28/21 by [facility] vani/Dr Riley cysts 2012    • PONV (postoperative nausea and vomiting)    • Renal pelvis transitional cell malignant neoplasm, right ([facility] [address] 09/2018    s/p Lap. Rt.  Nephroureterectomy    • Thrombophlebitis 2012    surgical   • Thyroid disease    • Varicosities 2007     B normal, no cyanosis or edema     Neurological:     Mental Status- Alert with cognitive impairment, especially affecting her memory    Cranial Nerves:  II.- Visual fields full to confrontation    V. Facial sensation intact  VII.  Face symmetric, no facial we CASCADE; Future  - CT BRAIN OR HEAD (63183); Future           Education and counseling provided to patient. Instructed patient to call my office or seek medical attention immediately if symptoms worsen.   Patient verbalized understanding of information give

## 2021-12-22 ENCOUNTER — HOSPITAL ENCOUNTER (OUTPATIENT)
Dept: CT IMAGING | Facility: HOSPITAL | Age: 86
Discharge: HOME | End: 2021-12-22
Attending: Other
Payer: MEDICARE

## 2021-12-22 DIAGNOSIS — F02.80 ALZHEIMER DISEASE (HCC): ICD-10-CM

## 2021-12-22 DIAGNOSIS — G30.9 ALZHEIMER DISEASE (HCC): ICD-10-CM

## 2021-12-22 PROCEDURE — 70450 CT HEAD/BRAIN W/O DYE: CPT | Performed by: OTHER

## 2021-12-23 ENCOUNTER — TELEPHONE (OUTPATIENT)
Dept: NEUROLOGY | Facility: CLINIC | Age: 86
End: 2021-12-23

## 2021-12-23 NOTE — TELEPHONE ENCOUNTER
----- Message from Marquita Cantu MD sent at 12/23/2021  7:27 AM CST -----  Please let patient know that CT brain didn't show significant abnormalities.

## 2021-12-30 ENCOUNTER — TELEPHONE (OUTPATIENT)
Dept: CASE MANAGEMENT | Age: 86
End: 2021-12-30

## 2021-12-30 NOTE — TELEPHONE ENCOUNTER
Patient is eligible for a 2022 Medicare Annual Wellness visit. This visit can be scheduled any time in the calendar year. Left message to call back.

## 2022-01-04 ENCOUNTER — TELEPHONE (OUTPATIENT)
Dept: FAMILY MEDICINE CLINIC | Facility: CLINIC | Age: 87
End: 2022-01-04

## 2022-01-05 NOTE — TELEPHONE ENCOUNTER
Patient's daughter calling stating she will call back to schedule medicare wellness visit by the end of the month due to the high peak of COVID.

## 2022-01-10 ENCOUNTER — TELEPHONE (OUTPATIENT)
Dept: NEUROLOGY | Facility: CLINIC | Age: 87
End: 2022-01-10

## 2022-01-10 ENCOUNTER — HOSPITAL ENCOUNTER (EMERGENCY)
Facility: HOSPITAL | Age: 87
Discharge: HOME | End: 2022-01-10
Attending: EMERGENCY MEDICINE
Payer: MEDICARE

## 2022-01-10 VITALS
HEIGHT: 69 IN | WEIGHT: 180 LBS | SYSTOLIC BLOOD PRESSURE: 157 MMHG | BODY MASS INDEX: 26.66 KG/M2 | DIASTOLIC BLOOD PRESSURE: 72 MMHG | TEMPERATURE: 98 F | RESPIRATION RATE: 18 BRPM | HEART RATE: 86 BPM | OXYGEN SATURATION: 99 %

## 2022-01-10 DIAGNOSIS — E86.0 DEHYDRATION: Primary | ICD-10-CM

## 2022-01-10 LAB
ALBUMIN SERPL-MCNC: 3.9 G/DL (ref 3.4–5)
ALBUMIN/GLOB SERPL: 1.2 {RATIO} (ref 1–2)
ALP LIVER SERPL-CCNC: 95 U/L
ALT SERPL-CCNC: 39 U/L
ANION GAP SERPL CALC-SCNC: 10 MMOL/L (ref 0–18)
AST SERPL-CCNC: 94 U/L (ref 15–37)
BASOPHILS # BLD AUTO: 0.05 X10(3) UL (ref 0–0.2)
BASOPHILS NFR BLD AUTO: 0.6 %
BILIRUB SERPL-MCNC: 1.3 MG/DL (ref 0.1–2)
BILIRUB UR QL: NEGATIVE
BUN BLD-MCNC: 33 MG/DL (ref 7–18)
BUN/CREAT SERPL: 15.5 (ref 10–20)
CALCIUM BLD-MCNC: 9.3 MG/DL (ref 8.5–10.1)
CHLORIDE SERPL-SCNC: 109 MMOL/L (ref 98–112)
CO2 SERPL-SCNC: 22 MMOL/L (ref 21–32)
COLOR UR: YELLOW
CREAT BLD-MCNC: 2.13 MG/DL
DEPRECATED RDW RBC AUTO: 46.8 FL (ref 35.1–46.3)
EOSINOPHIL # BLD AUTO: 0 X10(3) UL (ref 0–0.7)
EOSINOPHIL NFR BLD AUTO: 0 %
ERYTHROCYTE [DISTWIDTH] IN BLOOD BY AUTOMATED COUNT: 12.5 % (ref 11–15)
GLOBULIN PLAS-MCNC: 3.3 G/DL (ref 2.8–4.4)
GLUCOSE BLD-MCNC: 89 MG/DL (ref 70–99)
GLUCOSE UR-MCNC: NEGATIVE MG/DL
HCT VFR BLD AUTO: 41.4 %
HGB BLD-MCNC: 13.7 G/DL
HYALINE CASTS #/AREA URNS AUTO: PRESENT /LPF
IMM GRANULOCYTES # BLD AUTO: 0.04 X10(3) UL (ref 0–1)
IMM GRANULOCYTES NFR BLD: 0.4 %
KETONES UR-MCNC: 20 MG/DL
LEUKOCYTE ESTERASE UR QL STRIP.AUTO: NEGATIVE
LYMPHOCYTES # BLD AUTO: 0.94 X10(3) UL (ref 1–4)
LYMPHOCYTES NFR BLD AUTO: 10.4 %
MCH RBC QN AUTO: 33.4 PG (ref 26–34)
MCHC RBC AUTO-ENTMCNC: 33.1 G/DL (ref 31–37)
MCV RBC AUTO: 101 FL
MONOCYTES # BLD AUTO: 0.56 X10(3) UL (ref 0.1–1)
MONOCYTES NFR BLD AUTO: 6.2 %
NEUTROPHILS # BLD AUTO: 7.41 X10 (3) UL (ref 1.5–7.7)
NEUTROPHILS # BLD AUTO: 7.41 X10(3) UL (ref 1.5–7.7)
NEUTROPHILS NFR BLD AUTO: 82.4 %
NITRITE UR QL STRIP.AUTO: NEGATIVE
OSMOLALITY SERPL CALC.SUM OF ELEC: 299 MOSM/KG (ref 275–295)
PH UR: 5 [PH] (ref 5–8)
PLATELET # BLD AUTO: 293 10(3)UL (ref 150–450)
POTASSIUM SERPL-SCNC: 3.5 MMOL/L (ref 3.5–5.1)
PROT SERPL-MCNC: 7.2 G/DL (ref 6.4–8.2)
PROT UR-MCNC: 100 MG/DL
RBC # BLD AUTO: 4.1 X10(6)UL
SODIUM SERPL-SCNC: 141 MMOL/L (ref 136–145)
SP GR UR STRIP: 1.02 (ref 1–1.03)
UROBILINOGEN UR STRIP-ACNC: <2
WBC # BLD AUTO: 9 X10(3) UL (ref 4–11)

## 2022-01-10 PROCEDURE — 99285 EMERGENCY DEPT VISIT HI MDM: CPT

## 2022-01-10 PROCEDURE — 96361 HYDRATE IV INFUSION ADD-ON: CPT

## 2022-01-10 PROCEDURE — 96360 HYDRATION IV INFUSION INIT: CPT

## 2022-01-10 PROCEDURE — 85025 COMPLETE CBC W/AUTO DIFF WBC: CPT | Performed by: EMERGENCY MEDICINE

## 2022-01-10 PROCEDURE — 80053 COMPREHEN METABOLIC PANEL: CPT | Performed by: EMERGENCY MEDICINE

## 2022-01-10 PROCEDURE — 81001 URINALYSIS AUTO W/SCOPE: CPT | Performed by: EMERGENCY MEDICINE

## 2022-01-10 RX ORDER — QUETIAPINE 25 MG/1
25 TABLET, FILM COATED ORAL NIGHTLY PRN
Qty: 30 TABLET | Refills: 0 | Status: SHIPPED | OUTPATIENT
Start: 2022-01-10 | End: 2022-01-18

## 2022-01-10 RX ORDER — QUETIAPINE 25 MG/1
25 TABLET, FILM COATED ORAL ONCE
Status: COMPLETED | OUTPATIENT
Start: 2022-01-10 | End: 2022-01-10

## 2022-01-10 NOTE — ED QUICK NOTES
Pt more alert and able to follow simple commands. Pt eating pudding and juice, ok per provider. Waiting for medication to take affect for straight cath.

## 2022-01-10 NOTE — TELEPHONE ENCOUNTER
Received call from pt daughter who stated her mom is currently out of control & she has never seen her this way or this bad before. Pt daughter states pt is severely confused, frantic/agitated, & throwing things.  Pt daughter very concerned & states she [place]
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
## 2022-01-10 NOTE — ED PROVIDER NOTES
Patient Seen in: [facility] Emergency Department      History   Patient presents with:  Agitation    Stated Complaint:     Subjective:   HPI    History is provided by patient's daughter.     59-year-old female with history of dementia, hypertension 10/18/07, 11/29/07, 1/7/2011   • DEXA BONE DENSITY AXIAL (CPT=77080)  9/24/13   • LAPAROSCOPICALLY ASSISTED NEPHROURETERECTOMY Right 09/25/2018    Dr. Jay Griffith History    Tobacco Use      Smoking status: Former Smoker        Packs/day indicating adequate oxygenation.     PROCEDURES:  none    DIAGNOSTICS:   Labs:  Recent Results (from the past 24 hour(s))   Comp Metabolic Panel (14)    Collection Time: 01/10/22  4:34 PM   Result Value Ref Range    Glucose 89 70 - 99 mg/dL    Sodium 141 13 Yellow Yellow    Clarity Urine Hazy (A) Clear    Spec Gravity 1.023 1.001 - 1.030    Glucose Urine Negative Negative mg/dL    Bilirubin Urine Negative Negative    Ketones Urine 20  (A) Negative mg/dL    Blood Urine Moderate (A) Negative    pH Urine 5.0 5.0 home now , pt to f/u with Dr. Cindy Stokes in 2 days or return to ED sooner if symptoms worsen including fevers, chills, vomiting, pt's daughter expresses understanding and agrees to d/c instructions    EMERGENCY DEPARTMENT MEDICAL DECISION MAKING:  After Drea Garcia

## 2022-01-11 ENCOUNTER — APPOINTMENT (OUTPATIENT)
Dept: CT IMAGING | Facility: HOSPITAL | Age: 87
End: 2022-01-11
Attending: EMERGENCY MEDICINE
Payer: MEDICARE

## 2022-01-11 PROBLEM — R45.1 AGITATION: Status: ACTIVE | Noted: 2022-01-11

## 2022-01-11 PROBLEM — F01.518 MIXED VASCULAR AND NEURODEGENERATIVE DEMENTIA WITH BEHAVIORAL DISTURBANCE (HCC): Status: ACTIVE | Noted: 2021-01-06

## 2022-01-11 PROBLEM — F01.51 MIXED VASCULAR AND NEURODEGENERATIVE DEMENTIA WITH BEHAVIORAL DISTURBANCE (HCC): Status: ACTIVE | Noted: 2021-01-06

## 2022-01-11 PROBLEM — F39 EPISODIC MOOD DISORDER (HCC): Status: ACTIVE | Noted: 2022-01-11

## 2022-01-11 PROCEDURE — 70450 CT HEAD/BRAIN W/O DYE: CPT | Performed by: EMERGENCY MEDICINE

## 2022-01-11 NOTE — CM/SW NOTE
ALEXSANDER RECEIVED A CALL FROM THE  [facility] ABOUT THIS PATIENT. PATIENT WAS SEEN HERE LAST NIGHT FOR INCREASED AGITATION THAT STARTED A FEW DAYS AGO.  PATIENT WAS DISCHARGED HOME AND DAUGHTER WAS GIVEN INFORMATION ON FACILITIES THAT SHE COULD CONTACT AND

## 2022-01-11 NOTE — CONSULTS
[facility] HOSP - [facility]    Report of Consultation    Arabella Higgins Patient Status:  Emergency    1935 MRN [id number]   Location [address] Attending Chela Estrella MD   Hosp Day # 0 PCP Jacobo Givens MD Patient demonstrate response to internal stimuli and needs a lot of redirection for her compulsive distractibility.     Daughter at bedside reporting that patient started having some cognitive impairment for the last 3 years but she was managing until a few including hip 7/24/2014   • Benign neoplasm of skin of trunk, except scrotum 7/24/2014   • Benign neoplasm of skin of upper limb, including shoulder 7/24/2014   • Cataract    • Cellulitis    • Cholecystitis with cholelithiasis 3/5/2009   • Cystitis    • Sheeba Mancilla 26.0      Smokeless tobacco: Never Used    Alcohol use:  Yes      Alcohol/week: 0.0 standard drinks      Comment: 1 glass of wine/daily    Drug use: No          Current Medications:  risperiDONE (RISPERDAL) 1 MG/ML oral solution 0.5 mg, 0.5 mg, Oral, BID  m speech, very disorganized otherwise no dysarthria  Mood:  Noticeable irritable and labile  Affect:  Patient gets tearful at times and laughing at other time  Thought process:  Disorganized  Thought content:  Patient denying any suicidal or homicidal ideati Medical voice recognition dictation software and as a result, errors may occur. When identified, these errors have been corrected.  While every attempt is made to correct errors during dictation, discrepancies may still exist.

## 2022-01-11 NOTE — ED QUICK NOTES
Pt became extremely confused upon departure. Pt constantly pulling on emergency bathroom light. Unable to re-direct pt. No neuro deficits noted other than orientation. MD in room.  to be called for possible placement for dementia facility.

## 2022-01-11 NOTE — BH LEVEL OF CARE ASSESSMENT
Crisis Evaluation Assessment    Jarvis Richards YOB: 1935   Age 80year old MRN [id number]   Location [address] Attending Mathieu Lopez MD      Patient's legal sex: female  Patient identifies as: female  Pa daughter and no history of suicidal thoughts. Suicide Risk Assessments:    Source of information for CSSR: Collateral (daughter Bryon Mclaughlin.  (patioent did not answer most questions))  In what setting is the screener performed?: in person  1.  Have you wi been stable although, appetite has been minimal the past 2 days. Normally, she sleeps 8 hours per night. She has been sleeping 3 hours, the past 2 nights. Substance Use:  Niraj Maxwell is said to have a glass of wine, (or less) with dinner.   Daughter is Devices  Current/recent injuries or surgeries that affect mobility?: No  Physical Limitations Present: None  Independent in ambulation?: Yes  Transfer Assist: No Assistance Needed  Assistive Device Used[de-identified] None  History of falls?: No  Grooming  Level of ind appropriate for reporting to authorities    Mental Status Exam:   General Appearance  Characteristics: Edmorena Manifold (daughter reports she has been depressed about memeoruy loss and that hair is not as clean as usual)  Eye Contact: Direct; Indirect  Psychomoto taking an antidepressant following the death of her spouse, she has no history of mental health treatment. Sanjana presented to the ER twice in less than 24 hours. Daughter reports a sudden change in behavior with increased confusion and aggression.  Nguyen

## 2022-01-11 NOTE — ED NOTES
Spoke to Dr Abhishek Diaz regarding the patient. Unable to place in a memory Care unit due to agitation requiring soft restraints. Geriatric psych admission to be pursued.

## 2022-01-11 NOTE — ED QUICK NOTES
This RN spoke with pts daughter Melisa Graham. Dolores Garcia her mother was brought in d/t increasing aggressive behavior at home. Melisa Graham had attempted to call resources given to her via ER case management but had not received any phone calls back.  Melisa Graham is interested

## 2022-01-11 NOTE — CERTIFICATION
Ref: [facility] 5/3-403, 5/3-602, 5/3-607, 5/3-610    5/3-702, 5/3-813, 5/4-306, 5/4-402, 5/4-403    5/4-405, 5/4-501, 5/4-611, 3/4-576   Inpatient Certificate  Re: Josee Chamberlain    (name)     I personally informed the above-named individual of the [facility], to suffer mental or emotional deterioration and is reasonably expected, after such deterioration, to meet the criteria of either paragraph one or paragraph two above;   []  An individual who is developmentally disabled and unless treate

## 2022-01-11 NOTE — ED PROVIDER NOTES
Received in sign out from Dr. Anibal Kirk, patient awaits placement in memory [facility]. UA shows negative leukocyte esterase and nitrites, patient has asymptomatic bacteriuria which does not warrant empiric antibiotics, will send culture.   Patient is too agitat

## 2022-01-11 NOTE — PROGRESS NOTES
01/11/22 2301 [address]   Have you been practicing social distancing? Yes   Have you been wearing a mask when in the community? Yes   Are the people you live with following social distancing and wearing a mask?  Yes  (Lives with boy

## 2022-01-11 NOTE — CM/SW NOTE
ALEXSANDER SPOKE TO PATIENTS DAUGHTER AND GAVE HER INFORMATION FOR (A PLACE FOR MOM) INSTRUCTED HER TO CALL THE REPRESENTATIVE ON THE BROCHURE MARIA ALEJANDRA. ALSO GAVE HER A FEW OTHER HANDOUTS FOR MEMORY CARE PLACES. DAUGHTER VERBALIZED UNDERSTANDING .  ALEXSANDER ALSO TOLD

## 2022-01-11 NOTE — ED QUICK NOTES
Upon superior arrival to facility, pt only arousable to pain. Dr. Raquel Stiles notified, transport canceled.

## 2022-01-11 NOTE — ED QUICK NOTES
Pt resting in bed with daughter at bedside. This nurse attempted to take pt blood pressure but pt started screaming, pt calmed down after stopping blood pressure. Pt in 2 point soft restraints. Will continue to monitor.

## 2022-01-11 NOTE — ED PROVIDER NOTES
Patient Seen in: [facility] AND [facility] Emergency Department      History   Patient presents with:  Altered Mental Status    Stated Complaint: altered mental status    Subjective:   HPI    61-year-old female with Alzheimer's dementia reportedly here early for 12/5/2007   • CYSTOSCOPY,INSERT URETERAL STENT  10/18/07, 11/29/07, 1/7/2011   • DEXA BONE DENSITY AXIAL (CPT=77080)  9/24/13   • LAPAROSCOPICALLY ASSISTED NEPHROURETERECTOMY Right 09/25/2018    Dr. Maura Horne History    Tobacco Use vitals reviewed. Differential diagnosis includes Alzheimer's dementia with behavioral disturbance. ED Course     Labs Reviewed   ETHYL ALCOHOL - Normal   SARS-COV-2/FLU A AND B/RSV BY PCR (GENEXPERT) - Normal    Narrative:      This test is intended

## 2022-01-11 NOTE — BH PROGRESS NOTE
Dr. Bertram Huang accepted pt to [facility] bed 915A. Dr. Bertram Huang aware that a 2nd cert was completed today. Per Nursing Supervisor Luly Easton, pt does not need an Infection Safety block.  Writer gave SBAR to [employer] and provided pt's RN Richard Ash with N2N # to call for report

## 2022-01-12 PROBLEM — F03.91 MAJOR NEUROCOGNITIVE DISORDER POSSIBLY DUE TO VASCULAR DISEASE, WITH BEHAVIORAL DISTURBANCE (HCC): Status: ACTIVE | Noted: 2022-01-12

## 2022-01-12 PROBLEM — G30.9 MIXED ALZHEIMER'S AND VASCULAR DEMENTIA WITH BEHAVIOR DISTURBANCES (HCC): Chronic | Status: ACTIVE | Noted: 2022-01-12

## 2022-01-12 PROBLEM — F02.818 MIXED ALZHEIMER'S AND VASCULAR DEMENTIA WITH BEHAVIOR DISTURBANCES (HCC): Chronic | Status: ACTIVE | Noted: 2022-01-12

## 2022-01-12 PROBLEM — F01.518 MIXED ALZHEIMER'S AND VASCULAR DEMENTIA WITH BEHAVIOR DISTURBANCES (HCC): Chronic | Status: ACTIVE | Noted: 2022-01-12

## 2022-01-12 PROBLEM — F01.51 MIXED ALZHEIMER'S AND VASCULAR DEMENTIA WITH BEHAVIOR DISTURBANCES (HCC): Chronic | Status: ACTIVE | Noted: 2022-01-12

## 2022-01-12 NOTE — ED QUICK NOTES
Pt attempting to climb out of bed, attempt made to redirect and educate pt on importance of staying in bed, pt continued behavior. 5mg PRN IM haldol administered. Will continue to monitor.

## 2022-01-13 PROBLEM — F39 EPISODIC MOOD DISORDER (HCC): Status: RESOLVED | Noted: 2022-01-11 | Resolved: 2022-01-13

## 2022-01-13 PROBLEM — R45.1 AGITATION: Status: RESOLVED | Noted: 2022-01-11 | Resolved: 2022-01-13

## 2022-01-14 PROBLEM — F29 CHRONIC PSYCHOSIS (HCC): Chronic | Status: ACTIVE | Noted: 2022-01-14

## 2022-01-16 PROBLEM — F01.51 MIXED VASCULAR AND NEURODEGENERATIVE DEMENTIA WITH BEHAVIORAL DISTURBANCE (HCC): Status: RESOLVED | Noted: 2021-01-06 | Resolved: 2022-01-16

## 2022-01-16 PROBLEM — F01.518 MIXED VASCULAR AND NEURODEGENERATIVE DEMENTIA WITH BEHAVIORAL DISTURBANCE (HCC): Status: RESOLVED | Noted: 2021-01-06 | Resolved: 2022-01-16

## 2022-01-18 NOTE — PROGRESS NOTES
Elham Early Author: Osorio Peoples MD     1935 MRN [id number]   [facility]  Admission 22      Last Hospital Discharge 22 PCP Seun Alexanderraul of Discharge  [facility]        CC --admitted to [facility] hematomas R lobe of liver - benign   • Onychomycosis    • Osteoporosis    • Other seborrheic keratosis 7/24/2014   • Papanicolaou smear 7/17/2012    negative, atrophic pattern; predominantly parabasal cells   • Perineural cysts 2012    • PONV (postoperativ Oral Tab TAKE ONE TABLET BY MOUTH ONE TIME DAILY 90 tablet 0   • Levothyroxine Sodium 50 MCG Oral Tab Take 1 tablet (50 mcg total) by mouth daily.  90 tablet 3       REVIEW OF SYSTEMS:  Review of Systems:   Constitutional: No fevers, chills, fatigue or nigh administration, bowel/bladder care, pain/sleep assessment  - Physician supervision for multiple medical comorbidities, fall risk, DVT risk, infection risk, pain management  - Psych for adjustment to disability and cognitive deficits  - Social work/case man

## 2022-01-20 ENCOUNTER — APPOINTMENT (OUTPATIENT)
Dept: GENERAL RADIOLOGY | Facility: HOSPITAL | Age: 87
End: 2022-01-20
Attending: EMERGENCY MEDICINE
Payer: MEDICARE

## 2022-01-20 ENCOUNTER — HOSPITAL ENCOUNTER (EMERGENCY)
Facility: HOSPITAL | Age: 87
Discharge: HOME | End: 2022-01-20
Attending: EMERGENCY MEDICINE
Payer: MEDICARE

## 2022-01-20 ENCOUNTER — INITIAL APN SNF VISIT (OUTPATIENT)
Dept: INTERNAL MEDICINE CLINIC | Age: 87
End: 2022-01-20

## 2022-01-20 VITALS
BODY MASS INDEX: 23.7 KG/M2 | HEIGHT: 69 IN | DIASTOLIC BLOOD PRESSURE: 78 MMHG | RESPIRATION RATE: 18 BRPM | TEMPERATURE: 99 F | HEART RATE: 85 BPM | WEIGHT: 160 LBS | OXYGEN SATURATION: 97 % | SYSTOLIC BLOOD PRESSURE: 141 MMHG

## 2022-01-20 VITALS
HEART RATE: 103 BPM | SYSTOLIC BLOOD PRESSURE: 142 MMHG | WEIGHT: 168 LBS | DIASTOLIC BLOOD PRESSURE: 84 MMHG | TEMPERATURE: 98 F | RESPIRATION RATE: 20 BRPM | BODY MASS INDEX: 24 KG/M2

## 2022-01-20 DIAGNOSIS — R07.89 CHEST PRESSURE: ICD-10-CM

## 2022-01-20 DIAGNOSIS — R79.89 ELEVATED BRAIN NATRIURETIC PEPTIDE (BNP) LEVEL: ICD-10-CM

## 2022-01-20 DIAGNOSIS — R07.9 CHEST PAIN OF UNCERTAIN ETIOLOGY: Primary | ICD-10-CM

## 2022-01-20 DIAGNOSIS — N18.9 CHRONIC KIDNEY DISEASE, UNSPECIFIED CKD STAGE: ICD-10-CM

## 2022-01-20 DIAGNOSIS — I10 ESSENTIAL HYPERTENSION: Primary | ICD-10-CM

## 2022-01-20 DIAGNOSIS — R07.9 CHEST PAIN, UNSPECIFIED TYPE: ICD-10-CM

## 2022-01-20 DIAGNOSIS — F02.80 ALZHEIMER'S DISEASE (HCC): ICD-10-CM

## 2022-01-20 DIAGNOSIS — E03.9 ACQUIRED HYPOTHYROIDISM: ICD-10-CM

## 2022-01-20 DIAGNOSIS — R79.89 ELEVATED TSH: ICD-10-CM

## 2022-01-20 DIAGNOSIS — R45.1 AGITATION: ICD-10-CM

## 2022-01-20 DIAGNOSIS — G30.9 ALZHEIMER'S DISEASE (HCC): ICD-10-CM

## 2022-01-20 LAB
ALBUMIN SERPL-MCNC: 2.8 G/DL (ref 3.4–5)
ALBUMIN/GLOB SERPL: 0.7 {RATIO} (ref 1–2)
ALP LIVER SERPL-CCNC: 86 U/L
ALT SERPL-CCNC: 26 U/L
ANION GAP SERPL CALC-SCNC: 6 MMOL/L (ref 0–18)
AST SERPL-CCNC: 15 U/L (ref 15–37)
ATRIAL RATE: 78 BPM
ATRIAL RATE: 88 BPM
BASOPHILS # BLD AUTO: 0.05 X10(3) UL (ref 0–0.2)
BASOPHILS NFR BLD AUTO: 0.5 %
BILIRUB SERPL-MCNC: 0.6 MG/DL (ref 0.1–2)
BUN BLD-MCNC: 20 MG/DL (ref 7–18)
CALCIUM BLD-MCNC: 8.8 MG/DL (ref 8.5–10.1)
CHLORIDE SERPL-SCNC: 107 MMOL/L (ref 98–112)
CO2 SERPL-SCNC: 26 MMOL/L (ref 21–32)
CREAT BLD-MCNC: 1.48 MG/DL
EOSINOPHIL # BLD AUTO: 0.1 X10(3) UL (ref 0–0.7)
EOSINOPHIL NFR BLD AUTO: 1.1 %
ERYTHROCYTE [DISTWIDTH] IN BLOOD BY AUTOMATED COUNT: 12 %
GLOBULIN PLAS-MCNC: 4.1 G/DL (ref 2.8–4.4)
GLUCOSE BLD-MCNC: 110 MG/DL (ref 70–99)
HCT VFR BLD AUTO: 36.6 %
HGB BLD-MCNC: 12 G/DL
IMM GRANULOCYTES # BLD AUTO: 0.05 X10(3) UL (ref 0–1)
IMM GRANULOCYTES NFR BLD: 0.5 %
LYMPHOCYTES # BLD AUTO: 0.9 X10(3) UL (ref 1–4)
LYMPHOCYTES NFR BLD AUTO: 9.6 %
MAGNESIUM SERPL-MCNC: 2 MG/DL (ref 1.6–2.6)
MCH RBC QN AUTO: 32.7 PG (ref 26–34)
MCHC RBC AUTO-ENTMCNC: 32.8 G/DL (ref 31–37)
MCV RBC AUTO: 99.7 FL
MONOCYTES # BLD AUTO: 0.71 X10(3) UL (ref 0.1–1)
MONOCYTES NFR BLD AUTO: 7.6 %
NEUTROPHILS # BLD AUTO: 7.53 X10 (3) UL (ref 1.5–7.7)
NEUTROPHILS # BLD AUTO: 7.53 X10(3) UL (ref 1.5–7.7)
NEUTROPHILS NFR BLD AUTO: 80.7 %
NT-PROBNP SERPL-MCNC: 1218 PG/ML (ref ?–450)
OSMOLALITY SERPL CALC.SUM OF ELEC: 291 MOSM/KG (ref 275–295)
P AXIS: 52 DEGREES
P AXIS: 57 DEGREES
P-R INTERVAL: 158 MS
P-R INTERVAL: 160 MS
PLATELET # BLD AUTO: 298 10(3)UL (ref 150–450)
POTASSIUM SERPL-SCNC: 3.9 MMOL/L (ref 3.5–5.1)
PROT SERPL-MCNC: 6.9 G/DL (ref 6.4–8.2)
Q-T INTERVAL: 370 MS
Q-T INTERVAL: 400 MS
QRS DURATION: 100 MS
QRS DURATION: 100 MS
QTC CALCULATION (BEZET): 447 MS
QTC CALCULATION (BEZET): 456 MS
R AXIS: 12 DEGREES
R AXIS: 6 DEGREES
RBC # BLD AUTO: 3.67 X10(6)UL
SODIUM SERPL-SCNC: 139 MMOL/L (ref 136–145)
T AXIS: 75 DEGREES
T AXIS: 78 DEGREES
T4 FREE SERPL-MCNC: 1 NG/DL (ref 0.8–1.7)
TROPONIN I HIGH SENSITIVITY: 20 NG/L
TROPONIN I HIGH SENSITIVITY: 22 NG/L
TSI SER-ACNC: 4.08 MIU/ML (ref 0.36–3.74)
VENTRICULAR RATE: 78 BPM
VENTRICULAR RATE: 88 BPM
WBC # BLD AUTO: 9.3 X10(3) UL (ref 4–11)

## 2022-01-20 PROCEDURE — 93005 ELECTROCARDIOGRAM TRACING: CPT

## 2022-01-20 PROCEDURE — 84443 ASSAY THYROID STIM HORMONE: CPT | Performed by: EMERGENCY MEDICINE

## 2022-01-20 PROCEDURE — 84439 ASSAY OF FREE THYROXINE: CPT | Performed by: EMERGENCY MEDICINE

## 2022-01-20 PROCEDURE — 83735 ASSAY OF MAGNESIUM: CPT | Performed by: EMERGENCY MEDICINE

## 2022-01-20 PROCEDURE — 36415 COLL VENOUS BLD VENIPUNCTURE: CPT

## 2022-01-20 PROCEDURE — 93010 ELECTROCARDIOGRAM REPORT: CPT

## 2022-01-20 PROCEDURE — 84484 ASSAY OF TROPONIN QUANT: CPT | Performed by: EMERGENCY MEDICINE

## 2022-01-20 PROCEDURE — 3077F SYST BP >= 140 MM HG: CPT | Performed by: NURSE PRACTITIONER

## 2022-01-20 PROCEDURE — 80053 COMPREHEN METABOLIC PANEL: CPT | Performed by: EMERGENCY MEDICINE

## 2022-01-20 PROCEDURE — 85025 COMPLETE CBC W/AUTO DIFF WBC: CPT | Performed by: EMERGENCY MEDICINE

## 2022-01-20 PROCEDURE — 99310 SBSQ NF CARE HIGH MDM 45: CPT | Performed by: NURSE PRACTITIONER

## 2022-01-20 PROCEDURE — 71045 X-RAY EXAM CHEST 1 VIEW: CPT | Performed by: EMERGENCY MEDICINE

## 2022-01-20 PROCEDURE — 3079F DIAST BP 80-89 MM HG: CPT | Performed by: NURSE PRACTITIONER

## 2022-01-20 PROCEDURE — 99284 EMERGENCY DEPT VISIT MOD MDM: CPT

## 2022-01-20 PROCEDURE — 83880 ASSAY OF NATRIURETIC PEPTIDE: CPT | Performed by: EMERGENCY MEDICINE

## 2022-01-20 RX ORDER — ASPIRIN 81 MG/1
324 TABLET, CHEWABLE ORAL ONCE
Status: COMPLETED | OUTPATIENT
Start: 2022-01-20 | End: 2022-01-20

## 2022-01-20 NOTE — PROGRESS NOTES
Josee Chamberlain  : 1935  Age 80year old  female patient is admitted to Facility: [facility] for rehabilitation and medical management    Ventura López Admit date: Unknown    Discharge date to [facility]:  22  ELOS:  14 days  Antic Cellulitis    • Cholecystitis with cholelithiasis 3/5/2009   • Cystitis    • Dementia [facility])    • Depression    • Essential hypertension    • Fall    • Gross hematuria 2011   • High blood pressure    • History of IVP 2007   • History of pregnancy 1116,4391, Comment: 1 glass of wine/daily    Drug use: No      ALLERGIES:  No Known Allergies    CODE STATUS:  Full Code    ADVANCED CARE PLANNING TEAM: None      CURRENT MEDICATIONS   Current Outpatient Medications   Medication Sig Dispense Refill   • risperiDONE conjunctiva normal; there is no nystagmus, no drainage from eyes  HENT: normocephalic; normal nose, no nasal drainage, mucous membranes pink, moist, pharynx no exudate, no visible cerumen.   NECK: supple; FROM; no JVD, no TMG, no carotid bruits  BREAST: --- ]  Dialysis      Hospital score:     Attribute:  [ ]Points if positive:    Low hemoglobin at discharge (<12g/dl)                                [1]  Followed by Oncology service                                              [2]  Low sodium level at discharge

## 2022-01-20 NOTE — ED INITIAL ASSESSMENT (HPI)
PT TO ED FROM [facility] WITH C/O CHEST PAIN & PRESSURE SINCE THIS AM. PT HAS ALZHEIMERS AND IS HAVING DIFFICULTY VERBALIZING COMPLAINTS.  PER MEDICS PT DENIED SOB, N/V

## 2022-01-20 NOTE — ED PROVIDER NOTES
Patient Seen in: [facility] Emergency Department      History   Patient presents with:  Chest Pain Angina    Stated Complaint: chest pain since this AM     Subjective:   66-year-old female, history of Alzheimer's dementia, depression, presents with Papanicolaou smear 7/17/2012    negative, atrophic pattern; predominantly parabasal cells   • Perineural cysts 2012    • PONV (postoperative nausea and vomiting)    • Renal pelvis transitional cell malignant neoplasm, right ([facility] [address] 09/2018    s/p Lap.  Rt. Ne HPI.  Constitutional and vital signs reviewed. All other systems reviewed and negative except as noted above.     Physical Exam     ED Triage Vitals [01/20/22 1102]   /80   Pulse 90   Resp 20   Temp 98.6 °F (37 °C)   Temp src Oral   SpO2 95 %   O Behavior normal.               ED Course     Labs Reviewed   COMP METABOLIC PANEL (14) - Abnormal; Notable for the following components:       Result Value    Glucose 110 (*)     BUN 20 (*)     Creatinine 1.48 (*)     GFR, Non- 32 (*)     G Date: 1/20/2022  CONCLUSION:    Borderline heart size. Tortuous ectatic aorta. Interstitial markings are accentuated mild at the left base, mild-moderate right mid-lower lung. This may reflect subtle interstitial edema.   No localized consolidation or la

## 2022-01-24 ENCOUNTER — SNF DISCHARGE (OUTPATIENT)
Dept: INTERNAL MEDICINE CLINIC | Age: 87
End: 2022-01-24

## 2022-01-24 DIAGNOSIS — F02.80 ALZHEIMER'S DISEASE (HCC): ICD-10-CM

## 2022-01-24 DIAGNOSIS — R53.1 WEAKNESS: ICD-10-CM

## 2022-01-24 DIAGNOSIS — E03.9 ACQUIRED HYPOTHYROIDISM: ICD-10-CM

## 2022-01-24 DIAGNOSIS — G30.9 ALZHEIMER'S DISEASE (HCC): ICD-10-CM

## 2022-01-24 DIAGNOSIS — I10 ESSENTIAL HYPERTENSION: Primary | ICD-10-CM

## 2022-01-24 DIAGNOSIS — G30.9 ALZHEIMER'S DISEASE WITH BEHAVIORAL DISTURBANCE (HCC): ICD-10-CM

## 2022-01-24 DIAGNOSIS — F02.81 ALZHEIMER'S DISEASE WITH BEHAVIORAL DISTURBANCE (HCC): ICD-10-CM

## 2022-01-24 PROCEDURE — 99316 NF DSCHRG MGMT 30 MIN+: CPT | Performed by: NURSE PRACTITIONER

## 2022-01-24 NOTE — PROGRESS NOTES
[facility], 12/12/1935, 80year old, female is being discharged from Facility: [address]    Date of Admission:1/18/22    Date of Anticipated Discharge:1/25/22                               Admitting Diagnose Rate  ABDOMEN:  normal active BS+, soft, nondistended; no organomegaly, no masses; no bruits; nontender, no guarding, no rebound tenderness. :Deferred  LYMPHATIC:no lymphedema  MUSCULOSKELETAL: no acute synovitis upper or lower extremity.   Weakness R/T mIU/mL 4.080 (H)       Discharge Diagnoses w/ current management:  Alzheimer's Disease/Behavorial Disturbance/Weakness   -Tylenol 650 mg q6h prn for fever/pain, if given for fever, notify MD  -Donepezil 5 mg at bedtime  -Risperidone 0.25 mg bid  -Anticipat

## 2022-01-28 ENCOUNTER — TELEPHONE (OUTPATIENT)
Dept: FAMILY MEDICINE CLINIC | Facility: CLINIC | Age: 87
End: 2022-01-28

## 2022-01-28 NOTE — TELEPHONE ENCOUNTER
Per daughter, patient has been moved to [facility].  Message sent as fyi per daughter's request

## 2022-03-03 RX ORDER — LOSARTAN POTASSIUM 50 MG/1
TABLET ORAL
Qty: 90 TABLET | Refills: 0 | Status: SHIPPED | OUTPATIENT
Start: 2022-03-03

## 2022-07-29 ENCOUNTER — WALK IN (OUTPATIENT)
Dept: URGENT CARE | Age: 87
End: 2022-07-29
Attending: EMERGENCY MEDICINE

## 2022-07-29 VITALS
RESPIRATION RATE: 16 BRPM | BODY MASS INDEX: 25.48 KG/M2 | SYSTOLIC BLOOD PRESSURE: 137 MMHG | WEIGHT: 172 LBS | TEMPERATURE: 97.6 F | HEIGHT: 69 IN | HEART RATE: 76 BPM | OXYGEN SATURATION: 97 % | DIASTOLIC BLOOD PRESSURE: 81 MMHG

## 2022-07-29 DIAGNOSIS — N93.9 VAGINAL BLEEDING: Primary | ICD-10-CM

## 2022-07-29 PROCEDURE — 99204 OFFICE O/P NEW MOD 45 MIN: CPT

## 2022-07-29 RX ORDER — DONEPEZIL HYDROCHLORIDE 5 MG/1
TABLET, FILM COATED ORAL
COMMUNITY
Start: 2022-07-21

## 2022-07-29 RX ORDER — LOSARTAN POTASSIUM 50 MG/1
TABLET ORAL
COMMUNITY
Start: 2022-07-21

## 2022-07-29 RX ORDER — LEVOTHYROXINE SODIUM 0.05 MG/1
TABLET ORAL
COMMUNITY
Start: 2022-07-21

## 2022-07-29 RX ORDER — RISPERIDONE 0.25 MG/1
TABLET ORAL
COMMUNITY
Start: 2022-07-21

## 2022-07-29 ASSESSMENT — ENCOUNTER SYMPTOMS
RESPIRATORY NEGATIVE: 1
RHINORRHEA: 0
HEADACHES: 0
SINUS PRESSURE: 0
SORE THROAT: 0
NEUROLOGICAL NEGATIVE: 1
HEMATOLOGIC/LYMPHATIC NEGATIVE: 1
EYES NEGATIVE: 1
ALLERGIC/IMMUNOLOGIC NEGATIVE: 1
DIZZINESS: 0
SINUS PAIN: 0
ENDOCRINE NEGATIVE: 1
CONSTITUTIONAL NEGATIVE: 1
GASTROINTESTINAL NEGATIVE: 1
PSYCHIATRIC NEGATIVE: 1

## 2022-07-29 ASSESSMENT — PAIN SCALES - GENERAL: PAINLEVEL: 0

## 2022-08-01 ENCOUNTER — APPOINTMENT (OUTPATIENT)
Dept: OBGYN | Age: 87
End: 2022-08-01

## 2022-08-03 ENCOUNTER — APPOINTMENT (OUTPATIENT)
Dept: OBGYN | Age: 87
End: 2022-08-03

## 2022-08-03 ENCOUNTER — OFFICE VISIT (OUTPATIENT)
Dept: OBGYN | Age: 87
End: 2022-08-03

## 2022-08-03 VITALS
DIASTOLIC BLOOD PRESSURE: 78 MMHG | BODY MASS INDEX: 25.4 KG/M2 | HEART RATE: 80 BPM | TEMPERATURE: 98.4 F | RESPIRATION RATE: 12 BRPM | WEIGHT: 172 LBS | SYSTOLIC BLOOD PRESSURE: 128 MMHG

## 2022-08-03 DIAGNOSIS — N95.0 POSTMENOPAUSAL BLEEDING: ICD-10-CM

## 2022-08-03 DIAGNOSIS — N84.0 POLYP OF CORPUS UTERI: ICD-10-CM

## 2022-08-03 DIAGNOSIS — N95.0 POSTMENOPAUSAL BLEEDING: Primary | ICD-10-CM

## 2022-08-03 DIAGNOSIS — N85.8 OTHER SPECIFIED NONINFLAMMATORY DISORDERS OF UTERUS: ICD-10-CM

## 2022-08-03 PROCEDURE — 88305 TISSUE EXAM BY PATHOLOGIST: CPT | Performed by: PATHOLOGY

## 2022-08-03 PROCEDURE — 58100 BIOPSY OF UTERUS LINING: CPT | Performed by: STUDENT IN AN ORGANIZED HEALTH CARE EDUCATION/TRAINING PROGRAM

## 2022-08-03 PROCEDURE — 99203 OFFICE O/P NEW LOW 30 MIN: CPT | Performed by: STUDENT IN AN ORGANIZED HEALTH CARE EDUCATION/TRAINING PROGRAM

## 2022-08-03 PROCEDURE — 88342 IMHCHEM/IMCYTCHM 1ST ANTB: CPT | Performed by: PATHOLOGY

## 2022-08-03 RX ORDER — LOSARTAN POTASSIUM 50 MG/1
1 TABLET ORAL DAILY
COMMUNITY
Start: 2022-03-03

## 2022-08-05 ENCOUNTER — APPOINTMENT (OUTPATIENT)
Dept: OBGYN | Age: 87
End: 2022-08-05

## 2022-08-05 ENCOUNTER — LAB REQUISITION (OUTPATIENT)
Dept: LAB | Age: 87
End: 2022-08-05

## 2022-08-05 DIAGNOSIS — Z00.00 ROUTINE GENERAL MEDICAL EXAMINATION AT A HEALTH CARE FACILITY: ICD-10-CM

## 2022-08-05 DIAGNOSIS — Z00.00 ROUTINE GENERAL MEDICAL EXAMINATION AT A HEALTH CARE FACILITY: Primary | ICD-10-CM

## 2022-08-05 DIAGNOSIS — N95.0 POSTMENOPAUSAL BLEEDING: ICD-10-CM

## 2022-08-05 PROCEDURE — 88342 IMHCHEM/IMCYTCHM 1ST ANTB: CPT | Performed by: CLINICAL MEDICAL LABORATORY

## 2022-08-05 PROCEDURE — 88305 TISSUE EXAM BY PATHOLOGIST: CPT | Performed by: CLINICAL MEDICAL LABORATORY

## 2022-08-09 LAB — AP REPORT: NORMAL

## 2022-08-11 LAB
CASE RPRT: NORMAL
PATH REPORT.FINAL DX SPEC: NORMAL

## 2022-08-22 ENCOUNTER — OFFICE VISIT (OUTPATIENT)
Dept: OBGYN | Age: 87
End: 2022-08-22

## 2022-08-22 VITALS
HEIGHT: 69 IN | DIASTOLIC BLOOD PRESSURE: 82 MMHG | WEIGHT: 172 LBS | SYSTOLIC BLOOD PRESSURE: 140 MMHG | BODY MASS INDEX: 25.48 KG/M2 | RESPIRATION RATE: 16 BRPM

## 2022-08-22 DIAGNOSIS — N95.0 PMB (POSTMENOPAUSAL BLEEDING): Primary | ICD-10-CM

## 2022-08-22 DIAGNOSIS — N84.0 UTERINE POLYP: ICD-10-CM

## 2022-08-22 PROCEDURE — 99213 OFFICE O/P EST LOW 20 MIN: CPT | Performed by: STUDENT IN AN ORGANIZED HEALTH CARE EDUCATION/TRAINING PROGRAM

## 2022-08-24 ENCOUNTER — TELEPHONE (OUTPATIENT)
Dept: OBGYN | Age: 87
End: 2022-08-24

## 2022-08-24 DIAGNOSIS — N95.0 PMB (POSTMENOPAUSAL BLEEDING): Primary | ICD-10-CM

## 2022-08-24 DIAGNOSIS — N84.0 UTERINE POLYP: ICD-10-CM

## 2022-09-06 ENCOUNTER — TELEPHONE (OUTPATIENT)
Dept: OBGYN | Age: 87
End: 2022-09-06

## 2022-09-22 ENCOUNTER — APPOINTMENT (OUTPATIENT)
Dept: OBGYN | Age: 87
End: 2022-09-22

## 2022-10-12 ENCOUNTER — APPOINTMENT (OUTPATIENT)
Dept: OBGYN | Age: 87
End: 2022-10-12

## 2022-10-14 ENCOUNTER — OFFICE VISIT (OUTPATIENT)
Dept: OBGYN | Age: 87
End: 2022-10-14

## 2022-10-14 VITALS
WEIGHT: 161 LBS | RESPIRATION RATE: 16 BRPM | TEMPERATURE: 96.8 F | BODY MASS INDEX: 23.85 KG/M2 | SYSTOLIC BLOOD PRESSURE: 122 MMHG | HEIGHT: 69 IN | DIASTOLIC BLOOD PRESSURE: 78 MMHG

## 2022-10-14 DIAGNOSIS — N95.0 PMB (POSTMENOPAUSAL BLEEDING): Primary | ICD-10-CM

## 2022-10-14 DIAGNOSIS — N84.0 UTERINE POLYP: ICD-10-CM

## 2022-10-14 PROCEDURE — 99213 OFFICE O/P EST LOW 20 MIN: CPT | Performed by: STUDENT IN AN ORGANIZED HEALTH CARE EDUCATION/TRAINING PROGRAM

## 2022-10-14 RX ORDER — PSEUDOEPHEDRINE HCL 30 MG
100 TABLET ORAL
COMMUNITY

## 2022-10-14 RX ORDER — LIDOCAINE 4 G/G
1 PATCH TOPICAL DAILY
COMMUNITY

## 2023-08-31 ENCOUNTER — TELEPHONE (OUTPATIENT)
Dept: FAMILY MEDICINE CLINIC | Facility: CLINIC | Age: 88
End: 2023-08-31

## 2023-10-04 ENCOUNTER — LAB REQUISITION (OUTPATIENT)
Dept: LAB | Age: 88
End: 2023-10-04

## 2023-10-04 DIAGNOSIS — Z13.9 ENCOUNTER FOR SCREENING, UNSPECIFIED: ICD-10-CM

## 2023-10-04 PROCEDURE — 86480 TB TEST CELL IMMUN MEASURE: CPT | Performed by: CLINICAL MEDICAL LABORATORY

## 2023-10-04 PROCEDURE — PSEU9049 QUANTIFERON TB PLUS: Performed by: CLINICAL MEDICAL LABORATORY

## 2023-10-06 LAB
GAMMA INTERFERON BACKGROUND BLD IA-ACNC: 0.05 IU/ML
M TB IFN-G BLD-IMP: ABNORMAL
M TB IFN-G CD4+ BCKGRND COR BLD-ACNC: 0 IU/ML
M TB IFN-G CD4+CD8+ BCKGRND COR BLD-ACNC: 0.01 IU/ML
MITOGEN IGNF BCKGRD COR BLD-ACNC: 0.07 IU/ML

## 2023-10-11 ENCOUNTER — LAB REQUISITION (OUTPATIENT)
Dept: LAB | Age: 88
End: 2023-10-11

## 2023-10-11 DIAGNOSIS — Z13.9 ENCOUNTER FOR SCREENING, UNSPECIFIED: ICD-10-CM

## 2023-10-11 PROCEDURE — 86480 TB TEST CELL IMMUN MEASURE: CPT | Performed by: CLINICAL MEDICAL LABORATORY

## 2023-10-11 PROCEDURE — PSEU9049 QUANTIFERON TB PLUS: Performed by: CLINICAL MEDICAL LABORATORY

## 2023-10-13 LAB
GAMMA INTERFERON BACKGROUND BLD IA-ACNC: 0.03 IU/ML
M TB IFN-G BLD-IMP: NEGATIVE
M TB IFN-G CD4+ BCKGRND COR BLD-ACNC: 0.01 IU/ML
M TB IFN-G CD4+CD8+ BCKGRND COR BLD-ACNC: 0.01 IU/ML
MITOGEN IGNF BCKGRD COR BLD-ACNC: 0.5 IU/ML

## 2023-12-05 ENCOUNTER — TELEPHONE (OUTPATIENT)
Dept: FAMILY MEDICINE CLINIC | Facility: CLINIC | Age: 88
End: 2023-12-05

## 2024-04-05 NOTE — ED QUICK NOTES
Pt has a Hx of dementia. Pt alert and oriented to name. Pt unable to follow commands. Pt is agitated. Pt is guarding purse on chest and repeats \"dont take my purse! \"  Pt can be redirected intermittently. Daughter at bedside.  Daughter states pt has had 22:55

## 2025-03-06 NOTE — PHYSICAL THERAPY NOTE
Orders:    Ambulatory Referral to Occupational Therapy; Future     PHYSICAL THERAPY EVALUATION - INPATIENT     Room Number: 448/448-A  Evaluation Date: 9/27/2018  Type of Evaluation: Initial   Physician Order: PT Eval and Treat    Presenting Problem: pt with renal cancer    pt is s/p right nephrectomy on  9/25/18   Aria decrease activity tolerance. Pt declined to perform / practice bed mobility due to pain . Verbal review and handouts provided only with pt. Pt with cues and extra time needed for transfers, pt did require brief min assist to CGA if lower surfaces.   Pt s education;Gait training;Stair training;Transfer training;Balance training  Rehab Potential : Good  Frequency (Obs): 5x/week       PHYSICAL THERAPY MEDICAL/SOCIAL HISTORY     History related to current admission:  From sx report on 9/25/18     Indication fo  , 195 Vaginal,  Vaginal    • Intestinal adhesions 3/5/2009    lap lysis of adhesions   • Liver cyst     2 probable small cysts or hematomas R lobe of liver - benign   • Onychomycosis    • Osteoporosis    • Other seborrheic keratosis / of mild dementia on admission   Pt reports to therapy cognitive deficits and decrease memory on admission      Pt reports able to do bills  Yet reports signficant other assists with meds.   Pt reports was driving on admission     [address] Steppage;L Steppage(reliance on RW   slower gait speed )  Stoop/Curb Assistance: Other (Comment); Minimum assistance(CGA to min  up down 4 steps   )  Comment : family training with spouse to provide assist for pt as needed at d/c     Bed Mobility: declined

## (undated) DIAGNOSIS — I10 ESSENTIAL HYPERTENSION: ICD-10-CM

## (undated) DEVICE — SOL  .9 1000ML BTL

## (undated) DEVICE — PEN: MARKING STD PT 100/CS: Brand: MEDICAL ACTION INDUSTRIES

## (undated) DEVICE — SUTURE VICRYL 2-0 SH

## (undated) DEVICE — SPONGE: SPECIALTY PEANUT XR 100/CS: Brand: MEDICAL ACTION INDUSTRIES

## (undated) DEVICE — TROCAR: Brand: KII® SLEEVE

## (undated) DEVICE — VISUALIZATION SYSTEM: Brand: CLEARIFY

## (undated) DEVICE — SUTURE MONOCRYL 4-0 Y935H

## (undated) DEVICE — A P RESECTION: Brand: MEDLINE INDUSTRIES, INC.

## (undated) DEVICE — STAPLER INTNL 26CMX4MM X

## (undated) DEVICE — VIOLET BRAIDED (POLYGLACTIN 910), SYNTHETIC ABSORBABLE SUTURE: Brand: COATED VICRYL

## (undated) DEVICE — CLIP HEMOLOK LARGE PURPLE

## (undated) DEVICE — CLIP HEMOLOK MEDIUM GREEN

## (undated) DEVICE — AMPLATZ ULTRA STIFF FIXED CORE WIRE GUIDE: Brand: AMPLATZ

## (undated) DEVICE — [HIGH FLOW INSUFFLATOR,  DO NOT USE IF PACKAGE IS DAMAGED,  KEEP DRY,  KEEP AWAY FROM SUNLIGHT,  PROTECT FROM HEAT AND RADIOACTIVE SOURCES.]: Brand: PNEUMOSURE

## (undated) DEVICE — ENDOSCOPIC VALVE WITH ADAPTER.: Brand: SURSEAL® II

## (undated) DEVICE — MEDI-VAC NON-CONDUCTIVE SUCTION TUBING: Brand: CARDINAL HEALTH

## (undated) DEVICE — LAPAROSCOPIC TROCAR SLEEVE/SINGLE USE: Brand: KII® SLEEVE

## (undated) DEVICE — TUR/ENDOSCOPIC CABLE, 10' (3.05 M): Brand: CONMED

## (undated) DEVICE — TROCAR: Brand: KII FIOS FIRST ENTRY

## (undated) DEVICE — CLIP MED INTNL HMCLP TNTLM

## (undated) DEVICE — SOL H2O 1000ML BTL

## (undated) DEVICE — CYSTO PACK: Brand: MEDLINE INDUSTRIES, INC.

## (undated) DEVICE — CONNECTOR PRFSN RDCR .25IN 3/8

## (undated) DEVICE — DEVICE ENDO GIA 45 V/M

## (undated) DEVICE — FLEXOR, URETERAL ACCESS SHEATH WITH AQ, HYDROPHILIC COATING: Brand: FLEXOR

## (undated) DEVICE — ISOVUE 300 10X100ML VIAL

## (undated) DEVICE — STRYKER HARMONIC 36CM REPRCSED

## (undated) DEVICE — SOL  .9 3000ML

## (undated) DEVICE — PLUMEPORT ACTIV LAPAROSCOPIC SMOKE FILTRATION DEVICE: Brand: PLUMEPORT ACTIVE

## (undated) DEVICE — SOLO FLEX HYBRID GUIDEWIRE .03

## (undated) DEVICE — STERILE POLYISOPRENE POWDER-FREE SURGICAL GLOVES: Brand: PROTEXIS

## (undated) DEVICE — DUAL LUMEN CATHETER

## (undated) DEVICE — ABSORBABLE HEMOSTAT (OXIDIZED REGENERATED CELLULOSE, U.S.P.): Brand: SURGICEL

## (undated) DEVICE — INSUFFLATION NEEDLE TO ESTABLISH PNEUMOPERITONEUM.: Brand: INSUFFLATION NEEDLE

## (undated) DEVICE — DISPOSABLE SUCTION/IRRIGATOR TUBE SET: Brand: AHTO

## (undated) DEVICE — SUTURE VICRYL 0

## (undated) DEVICE — STERILE LATEX POWDER-FREE SURGICAL GLOVESWITH NITRILE COATING: Brand: PROTEXIS

## (undated) DEVICE — CLIP LG INTNL HMCLP TNTLM ESCP

## (undated) DEVICE — UROLOGY DRAIN BAG

## (undated) DEVICE — DERMABOND LIQUID ADHESIVE

## (undated) NOTE — ED AVS SNAPSHOT
[facility] Emergency Department  [place] [address] [place] [address]  Phone:  [phone]  Fax:  [phone]          Luc Elias   MRN: [id number]    Department:  [facility] Emergency Department   Date of Visit:  7/21/2017 visiting [email or web address].    IF THERE IS ANY CHANGE OR WORSENING OF YOUR CONDITION, CALL YOUR PRIMARY CARE PHYSICIAN AT ONCE OR RETURN IMMEDIATELY TO THE EMERGENCY DEPARTMENT.     If you have been prescribed any medication(s), please fill your prescription

## (undated) NOTE — MR AVS SNAPSHOT
After Visit Summary   10/10/2019    Anna Marie Mccormack    MRN: [id number]           Visit Information     Date & Time  10/10/2019  2:00 PM Provider  Dagoberto Flores MD [address] [place] [address], [address] [place] [address],[address], [facility]/[employer].  Phone complete it and provide feedback. We strive to deliver the best patient experience and are looking for ways to make improvements. Your feedback will help us do so. For more information on [employer], please visit [email or web address]